# Patient Record
Sex: MALE | Race: WHITE | NOT HISPANIC OR LATINO | Employment: FULL TIME | RURAL
[De-identification: names, ages, dates, MRNs, and addresses within clinical notes are randomized per-mention and may not be internally consistent; named-entity substitution may affect disease eponyms.]

---

## 2019-12-30 ENCOUNTER — OFFICE VISIT (OUTPATIENT)
Dept: FAMILY MEDICINE CLINIC | Facility: CLINIC | Age: 39
End: 2019-12-30

## 2019-12-30 VITALS
BODY MASS INDEX: 29.01 KG/M2 | SYSTOLIC BLOOD PRESSURE: 111 MMHG | RESPIRATION RATE: 18 BRPM | HEART RATE: 64 BPM | WEIGHT: 214.2 LBS | TEMPERATURE: 97.5 F | HEIGHT: 72 IN | OXYGEN SATURATION: 96 % | DIASTOLIC BLOOD PRESSURE: 79 MMHG

## 2019-12-30 DIAGNOSIS — E55.9 VITAMIN D DEFICIENCY: ICD-10-CM

## 2019-12-30 DIAGNOSIS — E78.2 MIXED HYPERLIPIDEMIA: ICD-10-CM

## 2019-12-30 DIAGNOSIS — Z13.29 SCREENING FOR HYPOTHYROIDISM: ICD-10-CM

## 2019-12-30 DIAGNOSIS — Z00.00 PHYSICAL EXAM: Primary | ICD-10-CM

## 2019-12-30 DIAGNOSIS — R53.81 MALAISE AND FATIGUE: ICD-10-CM

## 2019-12-30 DIAGNOSIS — Z13.6 SCREENING FOR HYPERTENSION: ICD-10-CM

## 2019-12-30 DIAGNOSIS — R53.83 MALAISE AND FATIGUE: ICD-10-CM

## 2019-12-30 PROBLEM — J01.90 SINUSITIS, ACUTE: Status: ACTIVE | Noted: 2019-03-30

## 2019-12-30 PROCEDURE — 99386 PREV VISIT NEW AGE 40-64: CPT | Performed by: FAMILY MEDICINE

## 2019-12-30 NOTE — PROGRESS NOTES
Chief Complaint   Patient presents with   • Annual Exam       History of Present Illness:  Subjective     History of Present Illness   Abner Champion is a 39 y.o. male here for his annual physical with me. Abner is here for coordination of medical care, to discuss health maintenance, disease prevention as well as to followup on medical problems. Patient is here for a new patient physical. Patient's last CPE was a doctor in [place]. Activity level is minimal. Exercises 0 per week. Appetite is good. Feels well with few complaints. Energy level is good and fair. Sleeps fairly well. Patient has never had to have a colonoscopy. Patient is  doing routine self skin exam monthly. Patient states that over all he is pretty healthy. He stats that he did just get over having the Flu. He states that other than that he fells pretty well.     Allergies:  No Known Allergies    Social History:  Social History     Socioeconomic History   • Marital status:      Spouse name: Not on file   • Number of children: Not on file   • Years of education: Not on file   • Highest education level: Not on file   Tobacco Use   • Smoking status: Current Some Day Smoker   • Smokeless tobacco: Never Used       Family History:  Family History   Problem Relation Age of Onset   • Diabetes Paternal Uncle    • Other Paternal Grandfather         memory issues    • Heart disease Paternal Grandfather    • Cancer Paternal Grandfather        Past Medical History :  Active Ambulatory Problems     Diagnosis Date Noted   • Daytime somnolence 05/04/2015   • Physical exam 05/04/2015   • Hyperlipidemia 11/30/2015   • Sinusitis, acute 03/30/2019   • Tobacco use disorder 11/19/2015   • Vitamin D deficiency 11/30/2015     Resolved Ambulatory Problems     Diagnosis Date Noted   • No Resolved Ambulatory Problems     No Additional Past Medical History       Medication List:    Current Outpatient Medications:   •  Multiple Vitamins-Minerals (CENTRUM MEN) tablet,  "CENTRUM MEN TABS, Disp: , Rfl:     Past Surgical History:  Past Surgical History:   Procedure Laterality Date   • HERNIA REPAIR      as a baby   • KNEE SURGERY          The following portions of the patient's history were reviewed and updated as appropriate: allergies, current medications, past family history, past medical history, past social history, past surgical history and problem list.    Review Of Systems:  Review of Systems   Constitutional: Negative for activity change, appetite change and fatigue.   HENT: Negative for congestion, postnasal drip, sinus pressure and sore throat.    Eyes: Negative for blurred vision and itching.   Respiratory: Negative for cough, shortness of breath and wheezing.    Cardiovascular: Negative for chest pain.   Gastrointestinal: Negative for abdominal pain, constipation, nausea, vomiting and GERD.   Endocrine: Negative for cold intolerance and heat intolerance.   Genitourinary: Negative for difficulty urinating, dysuria and urinary incontinence.   Musculoskeletal: Negative for back pain, joint swelling and neck pain.   Skin: Negative for color change and rash.   Neurological: Negative for dizziness, speech difficulty, weakness and memory problem.   Psychiatric/Behavioral: Negative for behavioral problems, decreased concentration, suicidal ideas and depressed mood.       Physical Exam:  Vital Signs:  Vitals:    12/30/19 1440   BP: 111/79   Pulse: 64   Resp: 18   Temp: 97.5 °F (36.4 °C)   SpO2: 96%   Weight: 97.2 kg (214 lb 3.2 oz)   Height: 182.9 cm (72\")     Body mass index is 29.05 kg/m².    Physical Exam   Constitutional: He is oriented to person, place, and time. He appears well-developed and well-nourished. He is active.   HENT:   Head: Normocephalic and atraumatic.   Nose: Nose normal.   Eyes: Pupils are equal, round, and reactive to light. Conjunctivae and lids are normal.   Neck: Normal range of motion. Neck supple.   Cardiovascular: Normal rate, regular rhythm, normal " heart sounds and normal pulses.   Pulmonary/Chest: Effort normal and breath sounds normal. No respiratory distress.   Abdominal: Soft. Bowel sounds are normal.   Musculoskeletal: Normal range of motion.   Neurological: He is alert and oriented to person, place, and time.   Skin: Skin is warm and dry. Capillary refill takes less than 2 seconds.   Psychiatric: He has a normal mood and affect. His behavior is normal. Judgment and thought content normal.   Vitals reviewed.        Assessment and Plan:  Diagnoses and all orders for this visit:    1. Physical exam (Primary)  Assessment & Plan:  Discussed injury prevention, diet and exercise, safe sexual practices, and screening for common diseases. Encouraged use of sunscreen and seatbelts. Avoidance of tobacco encouraged. Limitation or avoidance of alcohol encouraged. Recommend yearly dental and eye exams. Also discussed monitoring of blood pressure, lipids.        2. Mixed hyperlipidemia  -     Lipid Panel With / Chol / HDL Ratio    3. Vitamin D deficiency  -     Vitamin D 25 Hydroxy    4. Screening for hypertension  -     CBC & Differential  -     Comprehensive Metabolic Panel    5. Screening for hypothyroidism  -     TSH    6. Malaise and fatigue  -     Vitamin D 25 Hydroxy  -     Vitamin B12  -     Folate  -     Testosterone (Free & Total), LC / MS

## 2019-12-30 NOTE — ASSESSMENT & PLAN NOTE
Discussed injury prevention, diet and exercise, safe sexual practices, and screening for common diseases. Encouraged use of sunscreen and seatbelts. Avoidance of tobacco encouraged. Limitation or avoidance of alcohol encouraged. Recommend yearly dental and eye exams. Also discussed monitoring of blood pressure, lipids.

## 2020-01-05 LAB
25(OH)D3+25(OH)D2 SERPL-MCNC: 28.3 NG/ML (ref 30–100)
ALBUMIN SERPL-MCNC: 4.7 G/DL (ref 3.5–5.5)
ALBUMIN/GLOB SERPL: 1.7 {RATIO} (ref 1.2–2.2)
ALP SERPL-CCNC: 64 IU/L (ref 39–117)
ALT SERPL-CCNC: 29 IU/L (ref 0–44)
AST SERPL-CCNC: 32 IU/L (ref 0–40)
BASOPHILS # BLD AUTO: 0 X10E3/UL (ref 0–0.2)
BASOPHILS NFR BLD AUTO: 0 %
BILIRUB SERPL-MCNC: 0.3 MG/DL (ref 0–1.2)
BUN SERPL-MCNC: 14 MG/DL (ref 6–24)
BUN/CREAT SERPL: 13 (ref 9–20)
CALCIUM SERPL-MCNC: 9.5 MG/DL (ref 8.7–10.2)
CHLORIDE SERPL-SCNC: 100 MMOL/L (ref 96–106)
CHOLEST SERPL-MCNC: 235 MG/DL (ref 100–199)
CHOLEST/HDLC SERPL: 4.7 RATIO (ref 0–5)
CO2 SERPL-SCNC: 26 MMOL/L (ref 20–29)
CREAT SERPL-MCNC: 1.11 MG/DL (ref 0.76–1.27)
EOSINOPHIL # BLD AUTO: 0.2 X10E3/UL (ref 0–0.4)
EOSINOPHIL NFR BLD AUTO: 4 %
ERYTHROCYTE [DISTWIDTH] IN BLOOD BY AUTOMATED COUNT: 13.7 % (ref 12.3–15.4)
FOLATE SERPL-MCNC: 9.4 NG/ML
GLOBULIN SER CALC-MCNC: 2.7 G/DL (ref 1.5–4.5)
GLUCOSE SERPL-MCNC: 85 MG/DL (ref 65–99)
HCT VFR BLD AUTO: 44.5 % (ref 37.5–51)
HDLC SERPL-MCNC: 50 MG/DL
HGB BLD-MCNC: 15.5 G/DL (ref 13–17.7)
IMM GRANULOCYTES # BLD AUTO: 0 X10E3/UL (ref 0–0.1)
IMM GRANULOCYTES NFR BLD AUTO: 0 %
LDLC SERPL CALC-MCNC: 155 MG/DL (ref 0–99)
LYMPHOCYTES # BLD AUTO: 2.2 X10E3/UL (ref 0.7–3.1)
LYMPHOCYTES NFR BLD AUTO: 41 %
MCH RBC QN AUTO: 30.5 PG (ref 26.6–33)
MCHC RBC AUTO-ENTMCNC: 34.8 G/DL (ref 31.5–35.7)
MCV RBC AUTO: 87 FL (ref 79–97)
MONOCYTES # BLD AUTO: 0.7 X10E3/UL (ref 0.1–0.9)
MONOCYTES NFR BLD AUTO: 13 %
NEUTROPHILS # BLD AUTO: 2.3 X10E3/UL (ref 1.4–7)
NEUTROPHILS NFR BLD AUTO: 42 %
PLATELET # BLD AUTO: 303 X10E3/UL (ref 150–450)
POTASSIUM SERPL-SCNC: 4.8 MMOL/L (ref 3.5–5.2)
PROT SERPL-MCNC: 7.4 G/DL (ref 6–8.5)
RBC # BLD AUTO: 5.09 X10E6/UL (ref 4.14–5.8)
SODIUM SERPL-SCNC: 142 MMOL/L (ref 134–144)
TESTOST FREE SERPL-MCNC: 6.4 PG/ML (ref 6.8–21.5)
TESTOST SERPL-MCNC: 188 NG/DL (ref 264–916)
TRIGL SERPL-MCNC: 148 MG/DL (ref 0–149)
TSH SERPL DL<=0.005 MIU/L-ACNC: 3.96 UIU/ML (ref 0.45–4.5)
VIT B12 SERPL-MCNC: 697 PG/ML (ref 232–1245)
VLDLC SERPL CALC-MCNC: 30 MG/DL (ref 5–40)
WBC # BLD AUTO: 5.4 X10E3/UL (ref 3.4–10.8)

## 2020-09-08 DIAGNOSIS — M54.16 LUMBAR BACK PAIN WITH RADICULOPATHY AFFECTING LEFT LOWER EXTREMITY: ICD-10-CM

## 2020-09-08 DIAGNOSIS — M21.372 FOOT DROP, LEFT: Primary | ICD-10-CM

## 2020-09-09 ENCOUNTER — TELEPHONE (OUTPATIENT)
Dept: FAMILY MEDICINE CLINIC | Facility: CLINIC | Age: 40
End: 2020-09-09

## 2020-09-09 NOTE — TELEPHONE ENCOUNTER
Patient called yesterday and asked if we could get an MRI approved for him.   He has an appointment tomorrow with Dr Hutchison- she will address at that time. I called him to speak with him so he is aware she will be seeing him tomorrow.

## 2020-09-10 ENCOUNTER — OFFICE VISIT (OUTPATIENT)
Dept: FAMILY MEDICINE CLINIC | Facility: CLINIC | Age: 40
End: 2020-09-10

## 2020-09-10 ENCOUNTER — TRANSCRIBE ORDERS (OUTPATIENT)
Dept: ADMINISTRATIVE | Facility: HOSPITAL | Age: 40
End: 2020-09-10

## 2020-09-10 VITALS
RESPIRATION RATE: 18 BRPM | WEIGHT: 205.2 LBS | HEART RATE: 83 BPM | TEMPERATURE: 97.7 F | OXYGEN SATURATION: 98 % | HEIGHT: 72 IN | SYSTOLIC BLOOD PRESSURE: 128 MMHG | DIASTOLIC BLOOD PRESSURE: 60 MMHG | BODY MASS INDEX: 27.79 KG/M2

## 2020-09-10 DIAGNOSIS — M54.16 LUMBAR RADICULOPATHY: ICD-10-CM

## 2020-09-10 DIAGNOSIS — M21.372 FOOT DROP, LEFT: Primary | ICD-10-CM

## 2020-09-10 DIAGNOSIS — M21.372 LEFT FOOT DROP: Primary | ICD-10-CM

## 2020-09-10 PROCEDURE — 99213 OFFICE O/P EST LOW 20 MIN: CPT | Performed by: FAMILY MEDICINE

## 2020-09-10 RX ORDER — GABAPENTIN 300 MG/1
300 CAPSULE ORAL NIGHTLY
Qty: 30 CAPSULE | Refills: 1 | Status: SHIPPED | OUTPATIENT
Start: 2020-09-10 | End: 2021-08-30

## 2020-09-10 NOTE — PROGRESS NOTES
Chief Complaint   Patient presents with   • Foot Pain     numbness       Subjective   Abner Champion is a 40 y.o. male.     Foot Pain   This is a new (left foot) problem. Episode onset: one week. The problem occurs constantly. The problem has been gradually worsening. Associated symptoms include numbness and weakness. Pertinent negatives include no abdominal pain, chest pain, chills, fever, joint swelling, neck pain or rash. Associated symptoms comments: Tingling. The symptoms are aggravated by walking and twisting. He has tried heat, ice, acetaminophen, rest and immobilization (steroids) for the symptoms. The treatment provided mild relief.     Patient was seen at the  for this complaint and started on oral steroids.  Patient is here today to request an MRI be ordered.  Review of records showing that MRI has been ordered by  and he has an upcoming appointment with neurosurgery next week.       I have reviewed and updated his medications, medical history and problem list during today's office visit.       Past Medical History :  Active Ambulatory Problems     Diagnosis Date Noted   • Daytime somnolence 05/04/2015   • Physical exam 05/04/2015   • Hyperlipidemia 11/30/2015   • Sinusitis, acute 03/30/2019   • Tobacco use disorder 11/19/2015   • Vitamin D deficiency 11/30/2015     Resolved Ambulatory Problems     Diagnosis Date Noted   • No Resolved Ambulatory Problems     Past Medical History:   Diagnosis Date   • Low back pain        Medication List:    Current Outpatient Medications:   •  methylPREDNISolone (MEDROL) 4 MG dose pack, Take as directed on package instructions., Disp: 1 each, Rfl: 0  •  Multiple Vitamins-Minerals (CENTRUM MEN) tablet, CENTRUM MEN TABS, Disp: , Rfl:     No Known Allergies    Social History     Tobacco Use   • Smoking status: Former Smoker     Years: 15.00     Types: Cigarettes   • Smokeless tobacco: Current User     Types: Chew   Substance Use Topics   • Alcohol use: Yes     Comment:  "rare       Review of Systems   Constitutional: Negative for chills and fever.   Respiratory: Negative for shortness of breath.    Cardiovascular: Negative for chest pain and leg swelling.   Gastrointestinal: Negative for abdominal pain and diarrhea.   Genitourinary: Negative for difficulty urinating and urinary incontinence.   Musculoskeletal: Positive for gait problem. Negative for back pain, joint swelling, neck pain and neck stiffness.   Skin: Negative for rash and bruise.   Neurological: Positive for weakness and numbness. Negative for dizziness, tremors and syncope.       I have reviewed and confirmed the accuracy of the ROS as documented by the MA/LPN/RN Breana Hutchison MD      Objective   Vitals:    09/10/20 0822   BP: 128/60   BP Location: Right arm   Patient Position: Sitting   Cuff Size: Large Adult   Pulse: 83   Resp: 18   Temp: 97.7 °F (36.5 °C)   TempSrc: Skin   SpO2: 98%   Weight: 93.1 kg (205 lb 3.2 oz)   Height: 182.9 cm (72\")     Body mass index is 27.83 kg/m².    Physical Exam   Constitutional: He is oriented to person, place, and time. He appears well-developed and well-nourished. He does not appear ill. No distress.   HENT:   Head: Normocephalic and atraumatic.   Nose: Nose normal.   Mouth/Throat: Mucous membranes are moist.   Eyes: Conjunctivae are normal. No scleral icterus.   Neck: Normal range of motion. Neck supple.   Cardiovascular: Normal rate, regular rhythm and normal heart sounds.   No murmur heard.  Pulmonary/Chest: Effort normal and breath sounds normal.   Abdominal: Normal appearance.   Musculoskeletal: No swelling, tenderness, deformity or signs of injury.      Right lower leg: No edema.      Left lower leg: No edema.      Left foot: Normal range of motion. No tenderness or swelling.   Lymphadenopathy:     He has no cervical adenopathy.   Neurological: He is alert and oriented to person, place, and time. He displays weakness (0/5 dorisflexion of the left foot, otherwise, " 5/5 motor left lower extremity). He displays no atrophy. No cranial nerve deficit. He exhibits normal muscle tone. Coordination normal. Gait (foot drop) abnormal. Coordination normal.   Skin: Skin is warm. Capillary refill takes less than 2 seconds. No rash noted.   Psychiatric: His behavior is normal. Mood, judgment and thought content normal.       Mri Lumbar Spine Without Contrast    Result Date: 9/15/2020  Impression: IMPRESSION :  1. Degenerative changes as described. 2. Broad-based disc osteophyte complex most prominent in the left paracentral/lateral location at L5-S1. There is a left-sided neural foraminal narrowing without nerve root impingement. 3. Level by level analysis is discussed above.  Electronically Signed By-Paulino Contreras On:9/15/2020 8:50 AM This report was finalized on [id number] by  Paulino Contreras, .    Assessment/Plan     Diagnoses and all orders for this visit:    1. Left foot drop (Primary)  -     gabapentin (NEURONTIN) 300 MG capsule; Take 1 capsule by mouth Every Night.  Dispense: 30 capsule; Refill: 1  -     Cancel: MRI Lumbar Spine Without Contrast; Future    2. Lumbar radiculopathy  -     gabapentin (NEURONTIN) 300 MG capsule; Take 1 capsule by mouth Every Night.  Dispense: 30 capsule; Refill: 1    I will have him try gabapentin.  We will attempt to move his MRI up, if possible, so that he can have imaging complete prior to his appointment with neurosurgery next week.    No follow-ups on file.     I wore protective equipment throughout this patient encounter to include mask and eye protection. Hand hygiene was performed before donning protective equipment and after removal when leaving the room.  Patient also wore a mask.

## 2020-09-15 ENCOUNTER — HOSPITAL ENCOUNTER (OUTPATIENT)
Dept: MRI IMAGING | Facility: HOSPITAL | Age: 40
Discharge: HOME | End: 2020-09-15

## 2020-09-15 DIAGNOSIS — M21.372 LEFT FOOT DROP: ICD-10-CM

## 2020-09-15 DIAGNOSIS — M21.372 FOOT DROP, LEFT: ICD-10-CM

## 2020-09-15 DIAGNOSIS — M54.16 LUMBAR BACK PAIN WITH RADICULOPATHY AFFECTING LEFT LOWER EXTREMITY: ICD-10-CM

## 2020-09-15 PROCEDURE — 72148 MRI LUMBAR SPINE W/O DYE: CPT

## 2020-09-16 ENCOUNTER — OFFICE VISIT (OUTPATIENT)
Dept: NEUROSURGERY | Facility: CLINIC | Age: 40
End: 2020-09-16

## 2020-09-16 ENCOUNTER — HOSPITAL ENCOUNTER (OUTPATIENT)
Dept: INFUSION THERAPY | Facility: HOSPITAL | Age: 40
Discharge: HOME | End: 2020-09-16
Admitting: PHYSICAL MEDICINE & REHABILITATION

## 2020-09-16 ENCOUNTER — TELEPHONE (OUTPATIENT)
Dept: NEUROSURGERY | Facility: CLINIC | Age: 40
End: 2020-09-16

## 2020-09-16 DIAGNOSIS — G57.32 PERONEAL NERVE PALSY, LEFT: ICD-10-CM

## 2020-09-16 DIAGNOSIS — M21.372 FOOT DROP, LEFT: ICD-10-CM

## 2020-09-16 DIAGNOSIS — M47.16 SPONDYLOSIS WITH MYELOPATHY, LUMBAR REGION: ICD-10-CM

## 2020-09-16 DIAGNOSIS — M21.372 FOOT DROP, LEFT: Primary | ICD-10-CM

## 2020-09-16 PROCEDURE — 99442 PR PHYS/QHP TELEPHONE EVALUATION 11-20 MIN: CPT | Performed by: NURSE PRACTITIONER

## 2020-09-16 PROCEDURE — 95886 MUSC TEST DONE W/N TEST COMP: CPT

## 2020-09-16 PROCEDURE — 95908 NRV CNDJ TST 3-4 STUDIES: CPT

## 2020-09-16 RX ORDER — MELOXICAM 15 MG/1
15 TABLET ORAL NIGHTLY
Qty: 30 TABLET | Refills: 0 | Status: SHIPPED | OUTPATIENT
Start: 2020-09-16 | End: 2021-08-30

## 2020-09-16 NOTE — PROGRESS NOTES
You have chosen to receive care through a telephone visit. Do you consent to use a telephone visit for your medical care today? Yes      Subjective   History of Present Illness: Abner Champion is a 40 y.o. male being seen via telehealth today as a new patient.  Patient has complaints of left foot drop.  I have reviewed his records.  Patient complains of left foot drop that began approximately mid August.  Patient was running and noticed that his left ankle started pronating when he was running and he felt weak in his foot.  By September 5, patient totally lost all dorsiflexion in his left foot.  He also states that when this first happened he did experience some extreme pain from his knee down.  He stated that when he laid down flat it did aggravate his pain.  Patient reports doing nerve flossing daily which did resolve his pain.   He also states that he has accompanying numbness in his lower shin laterally into his big toe.  Patient did present to the [facility] when this first happened and he was referred here after imaging was completed.  Patient was prescribed dose of steroids that he reports did not help.  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  He states that during these long flights he does prop his left leg up at the edge of a desk along the outside of his knee.          The following portions of the patient's history were reviewed and updated as appropriate: allergies, current medications, past family history, past medical history, past social history, past surgical history and problem list.    Review of Systems   Genitourinary: Negative for difficulty urinating.   Musculoskeletal: Positive for back pain. Negative for gait problem, neck pain and neck stiffness.   Neurological: Positive for weakness and numbness.   All other systems reviewed and are negative.      Objective     .There were no vitals taken for this visit.   There is no height or weight on file to calculate BMI.    No  physical assessment performed due to telehealth visit  Patient is alert and oriented x3  Recent and remote memory intact  Speech is articulate and coherent    Assessment/Plan   Independent Review of Radiographic Studies:      I personally reviewed the images from the following studies.    MRI 9/15/2020  1.  Multilevel degenerative changes with spondylosis most notably at L4-L5 and L5-S1  2.  Large disc osteophyte complex most prominent in the left paracentral/lateral location at L5-S1 resulting in neuroforaminal narrowing.    Medical Decision Making:    Patient will be scheduled for an in office visit with Dr. Garcia.  He will need further assessment and evaluation for his left foot drop.  I am not certain at this time whether he is suffering from a peroneal palsy related to his left leg and knee positioning during his long flights or  a result of a radiculopathy  at the L L5-S1 nerve root.  Patient's MRI does correlate with his collective symptoms even though his pain is better he still is having paracentesis along that dermatome as well as motor weakness.  I will place him on a course of meloxicam to help reduce any inflammation and see if this does help.  Abner was seen today for leg pain.    Diagnoses and all orders for this visit:    Foot drop, left    Peroneal nerve palsy, left    Spondylosis with myelopathy, lumbar region    Other orders  -     meloxicam (MOBIC) 15 MG tablet; Take 1 tablet by mouth Every Night.      Return in about 1 week (around 9/23/2020).        I spent 20 minutes with the patient in direct communication reviewing patient symptoms and complaints and explaining the pathology and the treatment plan.  Patient understands and agrees with plan wishes to proceed.      Ashley Hernández, ROSS  09/16/20  13:54 EDT

## 2020-09-16 NOTE — PROCEDURES
HISTORY:     The patient is a 40-year-old non-diabetic male who presents with a complaint of left foot drop.  He states that he has been experiencing the symptoms since 9/5/20.  Associated symptoms include numbness of the left foot and lateral leg.    The patient denies pain in the low back or left knee.        I. NERVE CONDUCTION STUDIES:       The distal latency of the left peroneal motor nerve is 5.9 ms. The amplitude of evoked response is 325 uV (normal > 2.0 mV) at the ankle, 300 uV below the knee, and 75 uV above the knee. The conduction velocity is 46 m/sec below the knee and 29 m/s above the knee.       The distal latency of the left tibial motor nerve is 5.0 ms. The amplitude of evoked response is 4.0 mV. The conduction velocity is 40 m/sec.      The distal latency of the left sural sensory nerve is 3.0 ms. The amplitude of evoked response is 24 microvolts (normal > 15 uV).       II. ELECTROMYOGRAPHY:       Electromyography was performed on the following muscles of the left lower extremity using a monopolar needle: Tibialis anterior, peroneus longus, medial and lateral gastrocnemius, bicep femoris (long and short head), and extensor hallucis longus. The lumbar paraspinal muscles were also examined.      There were no changes in insertional activity. Denervation potentials were present in the left peroneus longus.  (NOTE: Peak denervation potentials tend to occur 10 to 14 days after the onset of symptoms.)    There was a moderate decrease in recruitment of MUAPs from the left peroneus longus and a marked decrease in recruitment of MUAPs from the left tibialis anterior and extensor hallucis longus.         III. IMPRESSION:      1. The study shows evidence of a neuropathic process involving the left peroneal nerve.  The pattern of electrophysiologic abnormalities  Is consistent with a neuropathic process involving the peroneal nerve at the level of or distal to the fibular head.      2. There is no  electrophysiologic evidence of a neuropathic process involving the tibial nerve.    3. There is no electrophysiologic evidence of a lumbar radiculopathy.  If clinical suspicion is high for a radicular process, spinal imaging should be considered.              Shelli Munguia M.D.*

## 2020-09-17 ENCOUNTER — APPOINTMENT (OUTPATIENT)
Dept: MRI IMAGING | Facility: HOSPITAL | Age: 40
End: 2020-09-17

## 2020-09-21 ENCOUNTER — OFFICE VISIT (OUTPATIENT)
Dept: NEUROSURGERY | Facility: CLINIC | Age: 40
End: 2020-09-21

## 2020-09-21 VITALS
DIASTOLIC BLOOD PRESSURE: 84 MMHG | WEIGHT: 200 LBS | BODY MASS INDEX: 27.09 KG/M2 | HEIGHT: 72 IN | HEART RATE: 67 BPM | RESPIRATION RATE: 18 BRPM | SYSTOLIC BLOOD PRESSURE: 135 MMHG

## 2020-09-21 DIAGNOSIS — M21.372 LEFT FOOT DROP: Primary | ICD-10-CM

## 2020-09-21 DIAGNOSIS — M21.372 FOOT DROP, LEFT: ICD-10-CM

## 2020-09-21 DIAGNOSIS — S84.12XA COMMON PERONEAL NERVE DYSFUNCTION OF LEFT LOWER EXTREMITY, INITIAL ENCOUNTER: ICD-10-CM

## 2020-09-21 PROCEDURE — 99203 OFFICE O/P NEW LOW 30 MIN: CPT | Performed by: NEUROLOGICAL SURGERY

## 2020-09-25 ENCOUNTER — TELEPHONE (OUTPATIENT)
Dept: NEUROSURGERY | Facility: CLINIC | Age: 40
End: 2020-09-25

## 2020-09-25 NOTE — TELEPHONE ENCOUNTER
THE PATIENT CALLED REGARDING HIS APPOINTMENT 09/21/20 WITH DR. ANDINO. HE STATED THAT HE WAS ADVISED TO HAVE AN MRI AND WAS CALLING TO SCHEDULE, BUT THERE ARE NO NEW ORDERS FOR AN MRI IN THE PATIENT'S CHART. THE PATIENT STATED THAT DR. ANDINO MENTIONED ORDERING OTHER TESTS AS WELL BUT COULD NOT RECALL WHAT THEY WERE.     PLEASE ADVISE. PATIENT CAN BE REACHED AT: [phone]    THANK YOU!

## 2020-10-05 PROBLEM — G57.30 COMMON PERONEAL NERVE DYSFUNCTION: Status: ACTIVE | Noted: 2020-10-05

## 2020-10-05 PROBLEM — M21.372 FOOT DROP, LEFT: Status: ACTIVE | Noted: 2020-10-05

## 2020-10-05 PROBLEM — S84.10XA COMMON PERONEAL NERVE DYSFUNCTION: Status: ACTIVE | Noted: 2020-10-05

## 2020-10-15 ENCOUNTER — TELEPHONE (OUTPATIENT)
Dept: NEUROSURGERY | Facility: CLINIC | Age: 40
End: 2020-10-15

## 2020-10-15 NOTE — TELEPHONE ENCOUNTER
THE PATIENT CALLED TO FOLLOW UP ON HIS MRI ORDER. HE SAID THAT HE LAST SPOKE WITH [facility] ON 9/25 AND WAS TOLD THAT THE OFFICE WAS FINALIZING SOME PAPERWORK TO HAVE THE MRI ORDERED.     PLEASE ADVISE. THE PATIENT CAN BE REACHED AT: [phone]    THANK YOU!

## 2020-10-15 NOTE — TELEPHONE ENCOUNTER
Called and left message for patient to call back.  He does not need an MRI.  He needs repeat EMG/NCV in 2 months.

## 2020-11-02 ENCOUNTER — TELEPHONE (OUTPATIENT)
Dept: NEUROSURGERY | Facility: CLINIC | Age: 40
End: 2020-11-02

## 2020-11-02 NOTE — TELEPHONE ENCOUNTER
THE PATIENT CALLED REGARDING HIS EMG WHICH WAS SCHEDULED FOR 11/10. THE PATIENT WAS CALLED AND TOLD THAT THE APPOINTMENT WAS CANCELLED, AND THAT HE WOULD BE CALLED BACK TO RESCHEDULE. HE WAS CALLED ABOUT THIS LAST WEEK AND HADN'T HEARD ANYTHING ABOUT SCHEDULING A NEW APPOINTMENT YET.     PLEASE ADVISE. THE PATIENT CAN BE REACHED AT: [phone]    THANK YOU!

## 2020-11-11 ENCOUNTER — HOSPITAL ENCOUNTER (OUTPATIENT)
Dept: INFUSION THERAPY | Facility: HOSPITAL | Age: 40
Discharge: HOME | End: 2020-11-11
Admitting: PHYSICAL MEDICINE & REHABILITATION

## 2020-11-11 ENCOUNTER — APPOINTMENT (OUTPATIENT)
Dept: INFUSION THERAPY | Facility: HOSPITAL | Age: 40
End: 2020-11-11

## 2020-11-11 DIAGNOSIS — M21.372 LEFT FOOT DROP: ICD-10-CM

## 2020-11-11 PROCEDURE — 95908 NRV CNDJ TST 3-4 STUDIES: CPT

## 2020-11-11 PROCEDURE — 95886 MUSC TEST DONE W/N TEST COMP: CPT

## 2020-11-11 NOTE — PROCEDURES
HISTORY:     The patient is a 40-year-old male who presents with a complaint of left foot drop and numbness of the left leg and foot.  He states that he has been experiencing the symptoms since 9/5/20.    This is a follow-up study.  The initial study was performed on 9/16/20.          I. NERVE CONDUCTION STUDIES:       The distal latency of the left peroneal motor nerve is 5.8 ms. The amplitude of evoked response is 350 uV (normal > 2.0 mV) at the ankle, 342 uV below the knee, and 338 uV above the knee. The conduction velocity is 41 m/sec below the knee and 30 m/sec above the knee.       The distal latency of the left tibial motor nerve is 5.1 ms. The amplitude of evoked response is 4.8 mV. The conduction velocity is 48 m/sec.      The distal latency of the left sural sensory nerve is 2.6 ms. The amplitude of evoked response is 21 microvolts.         II. ELECTROMYOGRAPHY:       Electromyography was performed on the following muscles of the left lower extremity using a monopolar needle: Tibialis anterior, peroneus longus, extensor digitorum longus,medial and lateral gastrocnemius, and bicep femoris (short head), and extensor hallucis longus.       There were no changes in insertional activity. Denervation potentials were present in the left tibialis anterior, extensor digitorum longus, and extensor hallucis longus.       There was a mild decrease in recruitment of MUAPs from the left peroneus longus and a moderate decrease in MUAPs from the left tibialis anterior, extensor digitorum longus, and extensor hallucis longus.         III. IMPRESSION:      The study continues to show evidence of a neuropathic process involving the left peroneal nerve at the level of or distal to the fibular head.  Improvement is noted in terms of recruitment of MUAPs since the 9/16/20 study.          Shelli Munguia M.D.*

## 2020-11-13 ENCOUNTER — TELEPHONE (OUTPATIENT)
Dept: NEUROSURGERY | Facility: CLINIC | Age: 40
End: 2020-11-13

## 2020-11-13 NOTE — TELEPHONE ENCOUNTER
----- Message from Buzz Garcia MD sent at 11/13/2020 10:59 AM EST -----  Contacted Pt yesterday.  He feels he has gained significant improvement.  At this time he is lifting his foot above 90 degrees.  Do not feel decompression warranted.  Will continue to follow and gave my cell for him to call in 2 months.

## 2020-11-13 NOTE — PROGRESS NOTES
Contacted Pt yesterday.  He feels he has gained significant improvement.  At this time he is lifting his foot above 90 degrees.  Do not feel decompression warranted.  Will continue to follow and gave my cell for him to call in 2 months.

## 2020-12-16 ENCOUNTER — APPOINTMENT (OUTPATIENT)
Dept: INFUSION THERAPY | Facility: HOSPITAL | Age: 40
End: 2020-12-16

## 2021-08-30 ENCOUNTER — OFFICE VISIT (OUTPATIENT)
Dept: FAMILY MEDICINE CLINIC | Facility: CLINIC | Age: 41
End: 2021-08-30

## 2021-08-30 VITALS
BODY MASS INDEX: 29.39 KG/M2 | TEMPERATURE: 98.4 F | RESPIRATION RATE: 16 BRPM | HEIGHT: 72 IN | OXYGEN SATURATION: 98 % | HEART RATE: 75 BPM | WEIGHT: 217 LBS | DIASTOLIC BLOOD PRESSURE: 70 MMHG | SYSTOLIC BLOOD PRESSURE: 132 MMHG

## 2021-08-30 DIAGNOSIS — Z00.00 PHYSICAL EXAM: Primary | ICD-10-CM

## 2021-08-30 DIAGNOSIS — R53.81 MALAISE AND FATIGUE: ICD-10-CM

## 2021-08-30 DIAGNOSIS — R53.83 MALAISE AND FATIGUE: ICD-10-CM

## 2021-08-30 DIAGNOSIS — E55.9 VITAMIN D DEFICIENCY: ICD-10-CM

## 2021-08-30 DIAGNOSIS — E78.2 MIXED HYPERLIPIDEMIA: ICD-10-CM

## 2021-08-30 DIAGNOSIS — N52.9 ERECTILE DYSFUNCTION, UNSPECIFIED ERECTILE DYSFUNCTION TYPE: ICD-10-CM

## 2021-08-30 PROCEDURE — 99396 PREV VISIT EST AGE 40-64: CPT | Performed by: FAMILY MEDICINE

## 2021-08-30 PROCEDURE — 99213 OFFICE O/P EST LOW 20 MIN: CPT | Performed by: FAMILY MEDICINE

## 2021-08-30 RX ORDER — MELATONIN
1000 DAILY
COMMUNITY

## 2021-08-30 RX ORDER — TADALAFIL 10 MG/1
10 TABLET ORAL DAILY PRN
Qty: 90 TABLET | Refills: 2 | Status: SHIPPED | OUTPATIENT
Start: 2021-08-30 | End: 2022-03-22

## 2021-08-30 NOTE — PROGRESS NOTES
Chief Complaint   Patient presents with   • Annual Exam   • Left foot numbness     Subjective   Abner Champion is a 41 y.o. male here for his annual physical with me. Abner is here for coordination of medical care, to discuss health maintenance, disease prevention as well as to followup on medical problems. Patient has been followed by me since 12/30/2019. Patient's last CPE was 2019. Activity level is minimal. Exercises 1 per week. Appetite is good. Feels well with few complaints. Energy level is poor. Sleeps fairly well. Patient's last colonoscopy was never. Patient is doing routine self skin exam occasionally.    Foot Injury   There was no injury mechanism. The pain is present in the left toes and left foot. Quality: numb. The patient is experiencing no pain. The pain has been constant since onset. Associated symptoms include numbness. He reports no foreign bodies present. Nothing aggravates the symptoms. Treatments tried: EMG, NCS 2020.     Erectile Dysfunction: Patient complains of erectile dysfunction.  Onset of dysfunction was 2 years ago and was gradual in onset.  Patient states the nature of difficulty is both attaining and maintaining erection. Full erections occur rarely. Partial erections occur rarely. Libido is affected. Risk factors for ED include none. Patient's expectations as to sexual function to be able to have sex with a full erection.  Patient's description of relationship w/partner good.  Previous treatment of ED includes nothing.         Allergies:  No Known Allergies    Social History:  Social History     Socioeconomic History   • Marital status:      Spouse name: Not on file   • Number of children: Not on file   • Years of education: Not on file   • Highest education level: Not on file   Tobacco Use   • Smoking status: Former Smoker     Years: 15.00     Types: Cigarettes   • Smokeless tobacco: Current User     Types: Chew   Substance and Sexual Activity   • Alcohol use: Yes      Comment: rare   • Drug use: Never   • Sexual activity: Defer       Family History:  Family History   Problem Relation Age of Onset   • Diabetes Paternal Uncle    • Other Paternal Grandfather         memory issues    • Heart disease Paternal Grandfather    • Cancer Paternal Grandfather        Past Medical History :  Active Ambulatory Problems     Diagnosis Date Noted   • Daytime somnolence 05/04/2015   • Physical exam 05/04/2015   • Hyperlipidemia 11/30/2015   • Sinusitis, acute 03/30/2019   • Tobacco use disorder 11/19/2015   • Vitamin D deficiency 11/30/2015   • Foot drop, left 10/05/2020   • 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     • thiamine (VITAMIN B-1) 100 MG tablet  tablet Take 100 mg by mouth Daily.     • tadalafil (CIALIS) 10 MG tablet Take 1 tablet by mouth Daily As Needed for Erectile Dysfunction. 90 tablet 2   • [DISCONTINUED] gabapentin (NEURONTIN) 300 MG capsule Take 1 capsule by mouth Every Night. 30 capsule 1   • [DISCONTINUED] meloxicam (MOBIC) 15 MG tablet Take 1 tablet by mouth Every Night. 30 tablet 0   • [DISCONTINUED] methylPREDNISolone (MEDROL) 4 MG dose pack Take as directed on package instructions. 1 each 0   • [DISCONTINUED] Multiple Vitamins-Minerals (CENTRUM MEN) tablet CENTRUM MEN TABS       No facility-administered encounter medications on file as of 8/30/2021.       Past Surgical History:  Past Surgical History:   Procedure Laterality Date   • HERNIA REPAIR      as a baby   • KNEE SURGERY         Review Of Systems:  Review of Systems   Constitutional: Negative for activity change, appetite change  "and fatigue.   HENT: Negative for congestion, postnasal drip, sinus pressure and sore throat.    Eyes: Negative for blurred vision and itching.   Respiratory: Negative for cough, shortness of breath and wheezing.    Cardiovascular: Negative for chest pain.   Gastrointestinal: Negative for abdominal pain, constipation, nausea, vomiting and GERD.   Endocrine: Negative for cold intolerance and heat intolerance.   Genitourinary: Negative for difficulty urinating, dysuria and urinary incontinence.   Musculoskeletal: Negative for back pain, joint swelling and neck pain.   Skin: Negative for color change and rash.   Neurological: Positive for numbness. Negative for dizziness, speech difficulty, weakness and memory problem.   Psychiatric/Behavioral: Negative for behavioral problems, decreased concentration, suicidal ideas and depressed mood.       The following portions of the patient's history were reviewed and updated as appropriate: allergies, current medications, past family history, past medical history, past social history, past surgical history and problem list.      Physical Exam:  Vital Signs:  Visit Vitals  /70 (BP Location: Right arm, Patient Position: Sitting, Cuff Size: Large Adult)   Pulse 75   Temp 98.4 °F (36.9 °C) (Skin)   Resp 16   Ht 182.9 cm (72\")   Wt 98.4 kg (217 lb)   SpO2 98%   BMI 29.43 kg/m²       Physical Exam  Vitals reviewed.   Constitutional:       Appearance: He is well-developed.   HENT:      Head: Normocephalic and atraumatic.      Nose: Nose normal.   Eyes:      General: Lids are normal.      Conjunctiva/sclera: Conjunctivae normal.      Pupils: Pupils are equal, round, and reactive to light.   Cardiovascular:      Rate and Rhythm: Normal rate and regular rhythm.      Pulses: Normal pulses.      Heart sounds: Normal heart sounds.   Pulmonary:      Effort: Pulmonary effort is normal. No respiratory distress.      Breath sounds: Normal breath sounds.   Abdominal:      General: Bowel " sounds are normal.      Palpations: Abdomen is soft.   Musculoskeletal:         General: Normal range of motion.      Cervical back: Normal range of motion and neck supple.   Skin:     General: Skin is warm and dry.      Capillary Refill: Capillary refill takes less than 2 seconds.   Neurological:      Mental Status: He is alert and oriented to person, place, and time.   Psychiatric:         Behavior: Behavior normal.         Thought Content: Thought content normal.         Judgment: Judgment normal.           Assessment and Plan:  Diagnoses and all orders for this visit:    1. Physical exam (Primary)  Assessment & Plan:  Discussed injury prevention, diet and exercise, safe sexual practices, and screening for common diseases. Encouraged use of sunscreen and seatbelts. Avoidance of tobacco encouraged. Limitation or avoidance of alcohol encouraged. Recommend yearly dental and eye exams. Also discussed monitoring of blood pressure, lipids.      2. Vitamin D deficiency  -     Vitamin D 25 Hydroxy    3. Mixed hyperlipidemia  Assessment & Plan:  Lipid abnormalities are worsening.  Nutritional counseling was provided.  Lipids will be reassessed in 3 months.    Orders:  -     CBC & Differential  -     Comprehensive Metabolic Panel  -     Lipid Panel With / Chol / HDL Ratio    4. Erectile dysfunction, unspecified erectile dysfunction type  Assessment & Plan:  Patient was started on Cialis to help with his symptoms.    Orders:  -     tadalafil (CIALIS) 10 MG tablet; Take 1 tablet by mouth Daily As Needed for Erectile Dysfunction.  Dispense: 90 tablet; Refill: 2    5. Malaise and fatigue  -     TSH  -     Testosterone (Free & Total), LC / MS  -     Vitamin B12

## 2021-09-01 ENCOUNTER — APPOINTMENT (OUTPATIENT)
Dept: CT IMAGING | Facility: HOSPITAL | Age: 41
End: 2021-09-01

## 2021-09-01 ENCOUNTER — HOSPITAL ENCOUNTER (OUTPATIENT)
Facility: HOSPITAL | Age: 41
Setting detail: OBSERVATION
Discharge: HOME | End: 2021-09-02
Attending: INTERNAL MEDICINE | Admitting: INTERNAL MEDICINE

## 2021-09-01 DIAGNOSIS — R10.9 ABDOMINAL PAIN, UNSPECIFIED ABDOMINAL LOCATION: Primary | ICD-10-CM

## 2021-09-01 DIAGNOSIS — R11.2 NAUSEA AND VOMITING, INTRACTABILITY OF VOMITING NOT SPECIFIED, UNSPECIFIED VOMITING TYPE: ICD-10-CM

## 2021-09-01 DIAGNOSIS — D72.829 LEUKOCYTOSIS, UNSPECIFIED TYPE: ICD-10-CM

## 2021-09-01 LAB
ALBUMIN SERPL-MCNC: 4.8 G/DL (ref 3.5–5.2)
ALBUMIN/GLOB SERPL: 1.6 G/DL
ALP SERPL-CCNC: 77 U/L (ref 39–117)
ALT SERPL W P-5'-P-CCNC: 15 U/L (ref 1–41)
ANION GAP SERPL CALCULATED.3IONS-SCNC: 16 MMOL/L (ref 5–15)
AST SERPL-CCNC: 18 U/L (ref 1–40)
B PARAPERT DNA SPEC QL NAA+PROBE: NOT DETECTED
B PERT DNA SPEC QL NAA+PROBE: NOT DETECTED
BASOPHILS # BLD AUTO: 0 10*3/MM3 (ref 0–0.2)
BASOPHILS NFR BLD AUTO: 0.3 % (ref 0–1.5)
BILIRUB SERPL-MCNC: 0.6 MG/DL (ref 0–1.2)
BILIRUB UR QL STRIP: NEGATIVE
BUN SERPL-MCNC: 20 MG/DL (ref 6–20)
BUN/CREAT SERPL: 17.2 (ref 7–25)
C PNEUM DNA NPH QL NAA+NON-PROBE: NOT DETECTED
CALCIUM SPEC-SCNC: 9.7 MG/DL (ref 8.6–10.5)
CHLORIDE SERPL-SCNC: 101 MMOL/L (ref 98–107)
CLARITY UR: ABNORMAL
CO2 SERPL-SCNC: 21 MMOL/L (ref 22–29)
COLOR UR: ABNORMAL
CREAT SERPL-MCNC: 1.16 MG/DL (ref 0.76–1.27)
DEPRECATED RDW RBC AUTO: 40.7 FL (ref 37–54)
EOSINOPHIL # BLD AUTO: 0 10*3/MM3 (ref 0–0.4)
EOSINOPHIL NFR BLD AUTO: 0.1 % (ref 0.3–6.2)
ERYTHROCYTE [DISTWIDTH] IN BLOOD BY AUTOMATED COUNT: 13.1 % (ref 12.3–15.4)
FLUAV SUBTYP SPEC NAA+PROBE: NOT DETECTED
FLUBV RNA ISLT QL NAA+PROBE: NOT DETECTED
GFR SERPL CREATININE-BSD FRML MDRD: 69 ML/MIN/1.73
GLOBULIN UR ELPH-MCNC: 3 GM/DL
GLUCOSE SERPL-MCNC: 133 MG/DL (ref 65–99)
GLUCOSE UR STRIP-MCNC: NEGATIVE MG/DL
HADV DNA SPEC NAA+PROBE: NOT DETECTED
HCOV 229E RNA SPEC QL NAA+PROBE: NOT DETECTED
HCOV HKU1 RNA SPEC QL NAA+PROBE: NOT DETECTED
HCOV NL63 RNA SPEC QL NAA+PROBE: NOT DETECTED
HCOV OC43 RNA SPEC QL NAA+PROBE: NOT DETECTED
HCT VFR BLD AUTO: 46.5 % (ref 37.5–51)
HGB BLD-MCNC: 16.1 G/DL (ref 13–17.7)
HGB UR QL STRIP.AUTO: NEGATIVE
HMPV RNA NPH QL NAA+NON-PROBE: NOT DETECTED
HOLD SPECIMEN: NORMAL
HOLD SPECIMEN: NORMAL
HPIV1 RNA SPEC QL NAA+PROBE: NOT DETECTED
HPIV2 RNA SPEC QL NAA+PROBE: NOT DETECTED
HPIV3 RNA NPH QL NAA+PROBE: NOT DETECTED
HPIV4 P GENE NPH QL NAA+PROBE: NOT DETECTED
KETONES UR QL STRIP: ABNORMAL
LEUKOCYTE ESTERASE UR QL STRIP.AUTO: NEGATIVE
LIPASE SERPL-CCNC: 22 U/L (ref 13–60)
LYMPHOCYTES # BLD AUTO: 1.1 10*3/MM3 (ref 0.7–3.1)
LYMPHOCYTES NFR BLD AUTO: 7.3 % (ref 19.6–45.3)
M PNEUMO IGG SER IA-ACNC: NOT DETECTED
MCH RBC QN AUTO: 30.3 PG (ref 26.6–33)
MCHC RBC AUTO-ENTMCNC: 34.7 G/DL (ref 31.5–35.7)
MCV RBC AUTO: 87.4 FL (ref 79–97)
MONOCYTES # BLD AUTO: 0.8 10*3/MM3 (ref 0.1–0.9)
MONOCYTES NFR BLD AUTO: 5.7 % (ref 5–12)
NEUTROPHILS NFR BLD AUTO: 12.6 10*3/MM3 (ref 1.7–7)
NEUTROPHILS NFR BLD AUTO: 86.6 % (ref 42.7–76)
NITRITE UR QL STRIP: NEGATIVE
NRBC BLD AUTO-RTO: 0.1 /100 WBC (ref 0–0.2)
PH UR STRIP.AUTO: 5.5 [PH] (ref 5–8)
PLATELET # BLD AUTO: 331 10*3/MM3 (ref 140–450)
PMV BLD AUTO: 8 FL (ref 6–12)
POTASSIUM SERPL-SCNC: 3.8 MMOL/L (ref 3.5–5.2)
PROT SERPL-MCNC: 7.8 G/DL (ref 6–8.5)
PROT UR QL STRIP: ABNORMAL
RBC # BLD AUTO: 5.32 10*6/MM3 (ref 4.14–5.8)
RHINOVIRUS RNA SPEC NAA+PROBE: NOT DETECTED
RSV RNA NPH QL NAA+NON-PROBE: NOT DETECTED
SARS-COV-2 RNA NPH QL NAA+NON-PROBE: NOT DETECTED
SODIUM SERPL-SCNC: 138 MMOL/L (ref 136–145)
SP GR UR STRIP: 1.04 (ref 1–1.03)
TROPONIN T SERPL-MCNC: <0.01 NG/ML (ref 0–0.03)
UROBILINOGEN UR QL STRIP: ABNORMAL
WBC # BLD AUTO: 14.6 10*3/MM3 (ref 3.4–10.8)
WHOLE BLOOD HOLD SPECIMEN: NORMAL
WHOLE BLOOD HOLD SPECIMEN: NORMAL

## 2021-09-01 PROCEDURE — 80053 COMPREHEN METABOLIC PANEL: CPT

## 2021-09-01 PROCEDURE — 83690 ASSAY OF LIPASE: CPT

## 2021-09-01 PROCEDURE — 0 IOPAMIDOL PER 1 ML: Performed by: PHYSICIAN ASSISTANT

## 2021-09-01 PROCEDURE — 81003 URINALYSIS AUTO W/O SCOPE: CPT

## 2021-09-01 PROCEDURE — 85652 RBC SED RATE AUTOMATED: CPT | Performed by: NURSE PRACTITIONER

## 2021-09-01 PROCEDURE — 84145 PROCALCITONIN (PCT): CPT | Performed by: NURSE PRACTITIONER

## 2021-09-01 PROCEDURE — 74177 CT ABD & PELVIS W/CONTRAST: CPT

## 2021-09-01 PROCEDURE — 93005 ELECTROCARDIOGRAM TRACING: CPT | Performed by: PHYSICIAN ASSISTANT

## 2021-09-01 PROCEDURE — G0378 HOSPITAL OBSERVATION PER HR: HCPCS

## 2021-09-01 PROCEDURE — 85025 COMPLETE CBC W/AUTO DIFF WBC: CPT

## 2021-09-01 PROCEDURE — 0202U NFCT DS 22 TRGT SARS-COV-2: CPT

## 2021-09-01 PROCEDURE — 86140 C-REACTIVE PROTEIN: CPT | Performed by: NURSE PRACTITIONER

## 2021-09-01 PROCEDURE — 84484 ASSAY OF TROPONIN QUANT: CPT | Performed by: PHYSICIAN ASSISTANT

## 2021-09-01 PROCEDURE — 99284 EMERGENCY DEPT VISIT MOD MDM: CPT

## 2021-09-01 RX ORDER — SODIUM CHLORIDE 0.9 % (FLUSH) 0.9 %
10 SYRINGE (ML) INJECTION AS NEEDED
Status: DISCONTINUED | OUTPATIENT
Start: 2021-09-01 | End: 2021-09-02 | Stop reason: HOSPADM

## 2021-09-01 RX ADMIN — SODIUM CHLORIDE 1000 ML: 9 INJECTION, SOLUTION INTRAVENOUS at 21:58

## 2021-09-01 RX ADMIN — IOPAMIDOL 100 ML: 755 INJECTION, SOLUTION INTRAVENOUS at 21:47

## 2021-09-02 ENCOUNTER — READMISSION MANAGEMENT (OUTPATIENT)
Dept: CALL CENTER | Facility: HOSPITAL | Age: 41
End: 2021-09-02

## 2021-09-02 ENCOUNTER — ON CAMPUS - OUTPATIENT (OUTPATIENT)
Dept: URBAN - METROPOLITAN AREA HOSPITAL 85 | Facility: HOSPITAL | Age: 41
End: 2021-09-02
Payer: COMMERCIAL

## 2021-09-02 ENCOUNTER — ON CAMPUS - OUTPATIENT (OUTPATIENT)
Dept: URBAN - METROPOLITAN AREA HOSPITAL 85 | Facility: HOSPITAL | Age: 41
End: 2021-09-02

## 2021-09-02 VITALS
HEIGHT: 72 IN | BODY MASS INDEX: 28.96 KG/M2 | TEMPERATURE: 97.5 F | OXYGEN SATURATION: 98 % | HEART RATE: 59 BPM | RESPIRATION RATE: 18 BRPM | WEIGHT: 213.85 LBS | DIASTOLIC BLOOD PRESSURE: 85 MMHG | SYSTOLIC BLOOD PRESSURE: 127 MMHG

## 2021-09-02 DIAGNOSIS — E78.5 HYPERLIPIDEMIA, UNSPECIFIED: ICD-10-CM

## 2021-09-02 DIAGNOSIS — R11.2 NAUSEA WITH VOMITING, UNSPECIFIED: ICD-10-CM

## 2021-09-02 DIAGNOSIS — E87.1 HYPO-OSMOLALITY AND HYPONATREMIA: ICD-10-CM

## 2021-09-02 DIAGNOSIS — R12 HEARTBURN: ICD-10-CM

## 2021-09-02 DIAGNOSIS — R10.13 EPIGASTRIC PAIN: ICD-10-CM

## 2021-09-02 DIAGNOSIS — R93.3 ABNORMAL FINDINGS ON DIAGNOSTIC IMAGING OF OTHER PARTS OF DI: ICD-10-CM

## 2021-09-02 DIAGNOSIS — R63.5 ABNORMAL WEIGHT GAIN: ICD-10-CM

## 2021-09-02 LAB
25(OH)D3+25(OH)D2 SERPL-MCNC: 46.7 NG/ML (ref 30–100)
ALBUMIN SERPL-MCNC: 4.7 G/DL (ref 4–5)
ALBUMIN/GLOB SERPL: 1.8 {RATIO} (ref 1.2–2.2)
ALP SERPL-CCNC: 77 IU/L (ref 48–121)
ALT SERPL-CCNC: 18 IU/L (ref 0–44)
ANION GAP SERPL CALCULATED.3IONS-SCNC: 9 MMOL/L (ref 5–15)
APTT PPP: 27.2 SECONDS (ref 24–31)
AST SERPL-CCNC: 18 IU/L (ref 0–40)
BASOPHILS # BLD AUTO: 0.1 10*3/MM3 (ref 0–0.2)
BASOPHILS # BLD AUTO: 0.1 X10E3/UL (ref 0–0.2)
BASOPHILS NFR BLD AUTO: 0.6 % (ref 0–1.5)
BASOPHILS NFR BLD AUTO: 1 %
BILIRUB SERPL-MCNC: 0.4 MG/DL (ref 0–1.2)
BUN SERPL-MCNC: 14 MG/DL (ref 6–24)
BUN SERPL-MCNC: 15 MG/DL (ref 6–20)
BUN/CREAT SERPL: 13.4 (ref 7–25)
BUN/CREAT SERPL: 15 (ref 9–20)
CALCIUM SERPL-MCNC: 9.8 MG/DL (ref 8.7–10.2)
CALCIUM SPEC-SCNC: 8.4 MG/DL (ref 8.6–10.5)
CHLORIDE SERPL-SCNC: 102 MMOL/L (ref 96–106)
CHLORIDE SERPL-SCNC: 104 MMOL/L (ref 98–107)
CHOLEST SERPL-MCNC: 196 MG/DL (ref 0–200)
CHOLEST SERPL-MCNC: 258 MG/DL (ref 100–199)
CHOLEST/HDLC SERPL: 5.6 RATIO (ref 0–5)
CO2 SERPL-SCNC: 25 MMOL/L (ref 20–29)
CO2 SERPL-SCNC: 27 MMOL/L (ref 22–29)
CREAT SERPL-MCNC: 0.96 MG/DL (ref 0.76–1.27)
CREAT SERPL-MCNC: 1.12 MG/DL (ref 0.76–1.27)
CRP SERPL-MCNC: 0.8 MG/DL (ref 0–0.5)
DEPRECATED RDW RBC AUTO: 41.1 FL (ref 37–54)
EOSINOPHIL # BLD AUTO: 0.2 X10E3/UL (ref 0–0.4)
EOSINOPHIL # BLD AUTO: 0.3 10*3/MM3 (ref 0–0.4)
EOSINOPHIL NFR BLD AUTO: 3 %
EOSINOPHIL NFR BLD AUTO: 3.8 % (ref 0.3–6.2)
ERYTHROCYTE [DISTWIDTH] IN BLOOD BY AUTOMATED COUNT: 13.2 % (ref 11.6–15.4)
ERYTHROCYTE [DISTWIDTH] IN BLOOD BY AUTOMATED COUNT: 13.2 % (ref 12.3–15.4)
ERYTHROCYTE [SEDIMENTATION RATE] IN BLOOD: 10 MM/HR (ref 0–15)
GFR SERPL CREATININE-BSD FRML MDRD: 72 ML/MIN/1.73
GLOBULIN SER CALC-MCNC: 2.6 G/DL (ref 1.5–4.5)
GLUCOSE SERPL-MCNC: 82 MG/DL (ref 65–99)
GLUCOSE SERPL-MCNC: 96 MG/DL (ref 65–99)
HCT VFR BLD AUTO: 41.7 % (ref 37.5–51)
HCT VFR BLD AUTO: 47.1 % (ref 37.5–51)
HDLC SERPL-MCNC: 37 MG/DL (ref 40–60)
HDLC SERPL-MCNC: 46 MG/DL
HGB BLD-MCNC: 14.5 G/DL (ref 13–17.7)
HGB BLD-MCNC: 15.7 G/DL (ref 13–17.7)
IMM GRANULOCYTES # BLD AUTO: 0 X10E3/UL (ref 0–0.1)
IMM GRANULOCYTES NFR BLD AUTO: 0 %
INR PPP: 0.98 (ref 0.93–1.1)
LDLC SERPL CALC-MCNC: 142 MG/DL (ref 0–100)
LDLC SERPL CALC-MCNC: 161 MG/DL (ref 0–99)
LDLC/HDLC SERPL: 3.8 {RATIO}
LYMPHOCYTES # BLD AUTO: 2.1 10*3/MM3 (ref 0.7–3.1)
LYMPHOCYTES # BLD AUTO: 2.2 X10E3/UL (ref 0.7–3.1)
LYMPHOCYTES NFR BLD AUTO: 25.4 % (ref 19.6–45.3)
LYMPHOCYTES NFR BLD AUTO: 31 %
MCH RBC QN AUTO: 30.4 PG (ref 26.6–33)
MCH RBC QN AUTO: 31.4 PG (ref 26.6–33)
MCHC RBC AUTO-ENTMCNC: 33.3 G/DL (ref 31.5–35.7)
MCHC RBC AUTO-ENTMCNC: 34.8 G/DL (ref 31.5–35.7)
MCV RBC AUTO: 90.3 FL (ref 79–97)
MCV RBC AUTO: 91 FL (ref 79–97)
MONOCYTES # BLD AUTO: 0.7 X10E3/UL (ref 0.1–0.9)
MONOCYTES # BLD AUTO: 0.8 10*3/MM3 (ref 0.1–0.9)
MONOCYTES NFR BLD AUTO: 10 %
MONOCYTES NFR BLD AUTO: 10 % (ref 5–12)
NEUTROPHILS # BLD AUTO: 4.1 X10E3/UL (ref 1.4–7)
NEUTROPHILS NFR BLD AUTO: 5 10*3/MM3 (ref 1.7–7)
NEUTROPHILS NFR BLD AUTO: 55 %
NEUTROPHILS NFR BLD AUTO: 60.2 % (ref 42.7–76)
NRBC BLD AUTO-RTO: 0.1 /100 WBC (ref 0–0.2)
PLATELET # BLD AUTO: 257 10*3/MM3 (ref 140–450)
PLATELET # BLD AUTO: 337 X10E3/UL (ref 150–450)
PMV BLD AUTO: 8.1 FL (ref 6–12)
POTASSIUM SERPL-SCNC: 4.5 MMOL/L (ref 3.5–5.2)
POTASSIUM SERPL-SCNC: 4.9 MMOL/L (ref 3.5–5.2)
PROCALCITONIN SERPL-MCNC: 0.05 NG/ML (ref 0–0.25)
PROT SERPL-MCNC: 7.3 G/DL (ref 6–8.5)
PROTHROMBIN TIME: 10.9 SECONDS (ref 9.6–11.7)
RBC # BLD AUTO: 4.62 10*6/MM3 (ref 4.14–5.8)
RBC # BLD AUTO: 5.17 X10E6/UL (ref 4.14–5.8)
SODIUM SERPL-SCNC: 140 MMOL/L (ref 136–145)
SODIUM SERPL-SCNC: 142 MMOL/L (ref 134–144)
TESTOST FREE SERPL-MCNC: 6.4 PG/ML (ref 6.8–21.5)
TESTOST SERPL-MCNC: 392.1 NG/DL (ref 264–916)
TRIGL SERPL-MCNC: 275 MG/DL (ref 0–149)
TRIGL SERPL-MCNC: 92 MG/DL (ref 0–150)
TROPONIN T SERPL-MCNC: <0.01 NG/ML (ref 0–0.03)
TSH SERPL DL<=0.005 MIU/L-ACNC: 2.18 UIU/ML (ref 0.45–4.5)
VIT B12 SERPL-MCNC: 653 PG/ML (ref 232–1245)
VLDLC SERPL CALC-MCNC: 51 MG/DL (ref 5–40)
VLDLC SERPL-MCNC: 17 MG/DL (ref 5–40)
WBC # BLD AUTO: 7.4 X10E3/UL (ref 3.4–10.8)
WBC # BLD AUTO: 8.2 10*3/MM3 (ref 3.4–10.8)

## 2021-09-02 PROCEDURE — 99253 IP/OBS CNSLTJ NEW/EST LOW 45: CPT | Performed by: NURSE PRACTITIONER

## 2021-09-02 PROCEDURE — G0378 HOSPITAL OBSERVATION PER HR: HCPCS

## 2021-09-02 PROCEDURE — 99217 PR OBSERVATION CARE DISCHARGE MANAGEMENT: CPT | Performed by: INTERNAL MEDICINE

## 2021-09-02 PROCEDURE — 85025 COMPLETE CBC W/AUTO DIFF WBC: CPT | Performed by: NURSE PRACTITIONER

## 2021-09-02 PROCEDURE — 85610 PROTHROMBIN TIME: CPT | Performed by: NURSE PRACTITIONER

## 2021-09-02 PROCEDURE — 80061 LIPID PANEL: CPT | Performed by: NURSE PRACTITIONER

## 2021-09-02 PROCEDURE — 84484 ASSAY OF TROPONIN QUANT: CPT | Performed by: NURSE PRACTITIONER

## 2021-09-02 PROCEDURE — 85730 THROMBOPLASTIN TIME PARTIAL: CPT | Performed by: NURSE PRACTITIONER

## 2021-09-02 PROCEDURE — 80048 BASIC METABOLIC PNL TOTAL CA: CPT | Performed by: NURSE PRACTITIONER

## 2021-09-02 PROCEDURE — 36415 COLL VENOUS BLD VENIPUNCTURE: CPT | Performed by: NURSE PRACTITIONER

## 2021-09-02 RX ORDER — MELATONIN
1000 DAILY
Status: DISCONTINUED | OUTPATIENT
Start: 2021-09-02 | End: 2021-09-02 | Stop reason: HOSPADM

## 2021-09-02 RX ORDER — ONDANSETRON 2 MG/ML
4 INJECTION INTRAMUSCULAR; INTRAVENOUS EVERY 6 HOURS PRN
Status: DISCONTINUED | OUTPATIENT
Start: 2021-09-02 | End: 2021-09-02 | Stop reason: HOSPADM

## 2021-09-02 RX ORDER — SUCRALFATE 1 G/1
1 TABLET ORAL
Qty: 120 TABLET | Refills: 0 | Status: SHIPPED | OUTPATIENT
Start: 2021-09-02 | End: 2021-10-02

## 2021-09-02 RX ORDER — KETOROLAC TROMETHAMINE 30 MG/ML
30 INJECTION, SOLUTION INTRAMUSCULAR; INTRAVENOUS EVERY 6 HOURS PRN
Status: DISCONTINUED | OUTPATIENT
Start: 2021-09-02 | End: 2021-09-02 | Stop reason: HOSPADM

## 2021-09-02 RX ORDER — SODIUM CHLORIDE 0.9 % (FLUSH) 0.9 %
10 SYRINGE (ML) INJECTION EVERY 12 HOURS SCHEDULED
Status: DISCONTINUED | OUTPATIENT
Start: 2021-09-02 | End: 2021-09-02 | Stop reason: HOSPADM

## 2021-09-02 RX ORDER — ACETAMINOPHEN 160 MG/5ML
650 SOLUTION ORAL EVERY 4 HOURS PRN
Status: DISCONTINUED | OUTPATIENT
Start: 2021-09-02 | End: 2021-09-02 | Stop reason: HOSPADM

## 2021-09-02 RX ORDER — MAGNESIUM SULFATE 1 G/100ML
1 INJECTION INTRAVENOUS AS NEEDED
Status: DISCONTINUED | OUTPATIENT
Start: 2021-09-02 | End: 2021-09-02 | Stop reason: HOSPADM

## 2021-09-02 RX ORDER — ONDANSETRON 4 MG/1
4 TABLET, FILM COATED ORAL EVERY 6 HOURS PRN
Status: DISCONTINUED | OUTPATIENT
Start: 2021-09-02 | End: 2021-09-02 | Stop reason: HOSPADM

## 2021-09-02 RX ORDER — MAGNESIUM SULFATE HEPTAHYDRATE 40 MG/ML
2 INJECTION, SOLUTION INTRAVENOUS AS NEEDED
Status: DISCONTINUED | OUTPATIENT
Start: 2021-09-02 | End: 2021-09-02 | Stop reason: HOSPADM

## 2021-09-02 RX ORDER — SODIUM CHLORIDE 0.9 % (FLUSH) 0.9 %
10 SYRINGE (ML) INJECTION AS NEEDED
Status: DISCONTINUED | OUTPATIENT
Start: 2021-09-02 | End: 2021-09-02 | Stop reason: HOSPADM

## 2021-09-02 RX ORDER — SUCRALFATE 1 G/1
1 TABLET ORAL
Status: DISCONTINUED | OUTPATIENT
Start: 2021-09-02 | End: 2021-09-02 | Stop reason: HOSPADM

## 2021-09-02 RX ORDER — ACETAMINOPHEN 650 MG/1
650 SUPPOSITORY RECTAL EVERY 4 HOURS PRN
Status: DISCONTINUED | OUTPATIENT
Start: 2021-09-02 | End: 2021-09-02 | Stop reason: HOSPADM

## 2021-09-02 RX ORDER — SODIUM CHLORIDE 9 MG/ML
75 INJECTION, SOLUTION INTRAVENOUS CONTINUOUS
Status: DISCONTINUED | OUTPATIENT
Start: 2021-09-02 | End: 2021-09-02 | Stop reason: HOSPADM

## 2021-09-02 RX ORDER — PANTOPRAZOLE SODIUM 40 MG/1
40 TABLET, DELAYED RELEASE ORAL
Status: DISCONTINUED | OUTPATIENT
Start: 2021-09-03 | End: 2021-09-02 | Stop reason: HOSPADM

## 2021-09-02 RX ORDER — ACETAMINOPHEN 325 MG/1
650 TABLET ORAL EVERY 4 HOURS PRN
Status: DISCONTINUED | OUTPATIENT
Start: 2021-09-02 | End: 2021-09-02 | Stop reason: HOSPADM

## 2021-09-02 RX ORDER — POTASSIUM CHLORIDE 20 MEQ/1
40 TABLET, EXTENDED RELEASE ORAL AS NEEDED
Status: DISCONTINUED | OUTPATIENT
Start: 2021-09-02 | End: 2021-09-02 | Stop reason: HOSPADM

## 2021-09-02 RX ORDER — PANTOPRAZOLE SODIUM 40 MG/1
40 TABLET, DELAYED RELEASE ORAL
Qty: 30 TABLET | Refills: 0 | Status: SHIPPED | OUTPATIENT
Start: 2021-09-03 | End: 2021-10-03

## 2021-09-02 RX ADMIN — SODIUM CHLORIDE 75 ML/HR: 9 INJECTION, SOLUTION INTRAVENOUS at 04:12

## 2021-09-02 RX ADMIN — Medication 10 ML: at 09:23

## 2021-09-02 RX ADMIN — Medication 1000 UNITS: at 09:23

## 2021-09-02 RX ADMIN — SUCRALFATE 1 G: 1 TABLET ORAL at 16:59

## 2021-09-02 NOTE — CASE MANAGEMENT/SOCIAL WORK
Discharge Planning Assessment  [facility]     Patient Name: Abner Champion  MRN: [id number]  Today's Date: 9/2/2021    Admit Date: 9/1/2021    Discharge Needs Assessment     Row Name 09/02/21 1520       Living Environment    Lives With  spouse    Name(s) of Who Lives With Patient  Spouse- April    Current Living Arrangements  home/apartment/condo    Primary Care Provided by  self    Provides Primary Care For  no one    Family Caregiver if Needed  spouse    Quality of Family Relationships  supportive;involved;helpful    Able to Return to Prior Arrangements  yes       Resource/Environmental Concerns    Resource/Environmental Concerns  none    Transportation Concerns  car, none       Transition Planning    Patient/Family Anticipates Transition to  home with family    Patient/Family Anticipated Services at Transition  none    Transportation Anticipated  family or friend will provide;car, drives self       Discharge Needs Assessment    Readmission Within the Last 30 Days  no previous admission in last 30 days    Equipment Currently Used at Home  none    Concerns to be Addressed  no discharge needs identified;denies needs/concerns at this time    Anticipated Changes Related to Illness  none    Equipment Needed After Discharge  none    Provided Post Acute Provider List?  Refused        Discharge Plan     Row Name 09/02/21 2159       Plan    Plan  DC Plan: Anticipate routine home, declined needs at this time.    Patient/Family in Agreement with Plan  yes    Plan Comments  CM met with patient at bedside to discuss dc planning. PCP confirmed (Mina Marques). Reported no trouble affording medications. Declined needs for any DME or HH services at this time.        Demographic Summary     Row Name 09/02/21 1520       General Information    Admission Type  observation    Referral Source  admission list    Reason for Consult  discharge planning    Preferred Language  English     Used During This Interaction  no       Contact  Information    Permission Granted to Share Info With          Functional Status     Row Name 09/02/21 1520       Functional Status    Usual Activity Tolerance  good    Current Activity Tolerance  good       Functional Status, IADL    Medications  independent    Meal Preparation  independent    Housekeeping  independent    Laundry  independent    Shopping  independent       Employment/    Employment Status  employed full-time        Met with patient in room wearing PPE: mask/goggles.      Maintained distance greater than six feet and spent less than 15 minutes in the room.      Kathleen Francois RN     Office Phone: [phone]  Office Cell: [phone]

## 2021-09-02 NOTE — CONSULTS
GI CONSULT  NOTE:    Referring Provider:  Dr. Pierre    Chief complaint: Abdominal pain    Subjective .     History of present illness: Patient is a 41-year-old male with history of hernia repair as an infant who presented to the hospital yesterday with complaints of abdominal pain.  Pain began in the upper abdomen/epigastric area 2 nights ago after he had eaten dinner he had eaten food from a restaurant a few days prior but none on the day of or day before his pain began.  Denies being around any ill contacts.  He does use occasional Advil but not frequently.  Only new medication recently was Cialis.  No recent antibiotics.  Did report nausea and vomiting with this along with significant acid reflux.  He induced vomiting on one occasion and this did improve his abdominal pain.  He normally moves his bowels daily but has not had a bowel movement since the pain began.  No rectal bleeding.      Endo History:  No previous EGD or colonoscopy.    Past Medical History:  Past Medical History:   Diagnosis Date   • Low back pain        Past Surgical History:  Past Surgical History:   Procedure Laterality Date   • HERNIA REPAIR      as a baby   • KNEE SURGERY         Social History:  Social History     Tobacco Use   • Smoking status: Former Smoker     Years: 15.00     Types: Cigarettes   • Smokeless tobacco: Current User     Types: Chew   Substance Use Topics   • Alcohol use: Yes     Comment: rare   • Drug use: Never       Family History:  Family History   Problem Relation Age of Onset   • Diabetes Paternal Uncle    • Other Paternal Grandfather         memory issues    • Heart disease Paternal Grandfather    • Cancer Paternal Grandfather        Medications:  Medications Prior to Admission   Medication Sig Dispense Refill Last Dose   • B-Complex tablet Take 1 tablet by mouth Daily.      • cholecalciferol (VITAMIN D3) 25 MCG (1000 UT) tablet Take 1,000 Units by mouth Daily.      • tadalafil (CIALIS) 10 MG tablet Take 1 tablet by  "mouth Daily As Needed for Erectile Dysfunction. 90 tablet 2        Scheduled Meds:cholecalciferol, 1,000 Units, Oral, Daily  sodium chloride, 10 mL, Intravenous, Q12H      Continuous Infusions:sodium chloride, 75 mL/hr, Last Rate: 75 mL/hr (09/02/21 3732)      PRN Meds:.•  acetaminophen **OR** acetaminophen **OR** acetaminophen  •  ketorolac  •  magnesium sulfate **OR** magnesium sulfate in D5W 1g/100mL (PREMIX)  •  ondansetron **OR** ondansetron  •  potassium chloride  •  sodium chloride  •  [COMPLETED] Insert peripheral IV **AND** sodium chloride  •  sodium chloride    ALLERGIES:  Patient has no known allergies.    ROS:  Review of Systems   Constitutional: Negative for chills and fever.   HENT: Negative for congestion and trouble swallowing.    Respiratory: Negative for cough and shortness of breath.    Cardiovascular: Negative for chest pain and palpitations.   Gastrointestinal: Positive for abdominal pain, nausea and vomiting. Negative for blood in stool and diarrhea.   Musculoskeletal: Negative for arthralgias.   Neurological: Negative for dizziness and weakness.   Psychiatric/Behavioral: Negative for agitation and confusion.       Objective     Vital Signs:   Visit Vitals  /74 (BP Location: Left arm, Patient Position: Lying)   Pulse 62   Temp 98 °F (36.7 °C) (Oral)   Resp 16   Ht 182.9 cm (72.01\")   Wt 97 kg (213 lb 13.5 oz)   SpO2 99%   BMI 29.00 kg/m²       Physical Exam:      General Appearance:    Awake and alert, in no acute distress   Head:    Normocephalic, without obvious abnormality, atraumatic   Eyes:            Conjunctivae normal, anicteric sclera   Ears:    Ears appear intact with no abnormalities noted   Throat:   No oral lesions, no thrush, oral mucosa moist   Neck:   No adenopathy, supple, no thyromegaly, no JVD   Lungs:     Respirations regular, even and unlabored       Chest Wall:    No abnormalities observed   Abdomen:     Soft, non-tender, no rebound or guarding, non-distended, no " hepatosplenomegaly   Rectal:     Deferred   Extremities:   Moves all extremities well, no edema, no cyanosis, no             redness   Pulses:   Pulses palpable and equal bilaterally   Skin:   No bleeding, bruising or rash, no jaundice   Lymph nodes:   No palpable adenopathy   Neurologic:   Cranial nerves 2 - 12 grossly intact, no asterixis, sensation intact       Results Review:   I reviewed the patient's labs and imaging.  CBC  Results from last 7 days   Lab Units 09/02/21 0615 09/01/21 2021 08/30/21  1019   RBC 10*6/mm3 4.62 5.32 5.17   WBC 10*3/mm3 8.20 14.60* 7.4   HEMOGLOBIN g/dL 14.5 16.1 15.7   PLATELETS 10*3/mm3 257 331 337       CMP  Results from last 7 days   Lab Units 09/02/21 0615 09/01/21 2021 08/30/21  1019   SODIUM mmol/L 140 138 142   POTASSIUM mmol/L 4.5 3.8 4.9   CHLORIDE mmol/L 104 101 102   TOTAL CO2 mmol/L  --   --  25   CO2 mmol/L 27.0 21.0*  --    BUN mg/dL 15 20 14   CREATININE mg/dL 1.12 1.16 0.96   GLUCOSE mg/dL 96 133*  --    ALBUMIN g/dL  --  4.80 4.7   BILIRUBIN mg/dL  --  0.6 0.4   ALK PHOS U/L  --  77 77   AST (SGOT) U/L  --  18 18   ALT (SGPT) U/L  --  15 18       Amylase and Lipase  Results from last 7 days   Lab Units 09/01/21 2021   LIPASE U/L 22       CRP     Results from last 7 days   Lab Units 09/01/21 2021   CRP mg/dL 0.80*       Imaging Results (Last 24 Hours)     Procedure Component Value Units Date/Time    CT Abdomen Pelvis With Contrast [[id number]] Collected: 09/01/21 2155     Updated: 09/01/21 2210    Narrative:      CT ABDOMEN PELVIS W CONTRAST-     Date of Exam: 9/1/2021 9:46 PM     Indication: epigastric and periumbilical pain.     Comparison: None available.     Technique: Contiguous axial CT images were obtained from the lung bases  to the superior iliac crests without contrast.  Following uneventful IV  administration of 100 Isovue-370 and oral contrast, contiguous axial  images obtained from lung bases to the pubic symphysis. Sagittal and  coronal  reconstructions were performed.  Automated exposure control and  iterative reconstruction methods were used.     FINDINGS:  The lung bases are clear. No abnormality is seen in the liver,  gallbladder, adrenals, spleen, pancreas, or the mesenteric vessels show  no abnormality. Aorta. The kidneys appear normal. No ureteral stone or  gross bladder abnormality. No adenopathy is seen.     There is a small hiatal hernia. The stomach contains a moderate amount  of fluid. The proximal jejunum is fluid-filled and mildly distended.  There is thickening and increased density of the wall of several of the  mid jejunal loops. There is minimal adjacent mesenteric stranding.   There is subtle wall thickening of a loop of small bowel in the right  abdomen with some minor mesenteric stranding..      The appendix appears normal. There is fluid in the ascending,  transverse, and proximal-mid descending colon. There is sigmoid  diverticulosis.     There is a small amount of free fluid in the pelvis. There is no free  air.     Bone windows show degenerative disc disease at L5-S1.       Impression:      1. There are abnormalities in the small bowel. A few loops of mid  jejunum show wall thickening, which could be due to inflammation,  enteritis, or bowel wall hemorrhage. There is slight distention of the  jejunum proximal to this.  2.  Separate from the above-described finding,, there is subtle  thickening of the wall of a small bowel loop in the right abdomen with  some mild mesenteric stranding, with a similar differential. The  terminal ileum does not look particularly abnormal.  3. There is fluid throughout much of the small bowel and in much of the  colon, suggesting enteritis-colitis.  4. There is a small amount of free fluid, abnormal for a male patient,  which is nonspecific.  5. Negative for appendicitis.     Electronically Signed By-Eri Almonte MD On:9/1/2021 10:08 PM  This report was finalized on [id number] by  Eri  MD Gage.            ASSESSMENT AND PLAN:    Epigastric pain  Nausea/vomiting  Dyspepsia  Abnormal CT suggesting thickening of small bowel, fluid-filled small bowel and colon  History of hernia repair as an infant    Principal Problem:    Abdominal pain  Active Problems:    Hyperlipidemia    Hyponatremia     Plan:  41-year-old male presented to the hospital yesterday with epigastric pain, nausea/vomiting, and dyspepsia which began suddenly after dinner 2 nights ago.  CT shows thickening of jejunum and a separate area of thickening of the small bowel but terminal ileum appears normal.  Liver enzymes and lipase normal.  CRP 0.8.  No family history of inflammatory bowel disease and symptoms occurred suddenly.  He had no associated diarrhea.  Consider infectious etiology, medication induced, or adhesions.  He is feeling much better with no abdominal pain at this time and no further nausea/vomiting.  Only complaint is of acid reflux.  He has been tolerating clear liquids well.  Recommend starting pantoprazole/Carafate and consider advancing diet.  If patient feels well he would like to follow-up as outpatient.  Otherwise we can consider endoscopy.    I discussed the patients findings and my recommendations with the patient.  Breana Barrientos, ROSS  09/02/21  15:16 EDT

## 2021-09-02 NOTE — ED PROVIDER NOTES
Subjective   Chief Complaint: Abdominal pain    Patient is a 41-year-old  male no significant past medical history presents the ER with complaints of epigastric abdominal pain since last night.  He reports some acid reflux last night after eating dinner, states he has had epigastric and periumbilical abdominal pain since then.  Describes as a squeezing and sharp pain in his upper abdomen occasionally radiates into his chest.  Patient states pain has been a 9/10 most of today, however states that since being in the ER his pain has improved to a 4/10.  He reports nausea and 2 episodes of vomiting earlier today, no diarrhea.  Patient reports decreased urine output but states he has not been able to drink anything today.  He denies chest pain shortness of breath cough or headache at this time.  No fever or chills.  He denies any previous abdominal surgical history.    Location: Epigastric abdomen    Quality: Squeezing, sharp    Duration: 1 day    Timing: Intermittent    Severity: Mild to moderate    Associated Symptoms: Nausea vomiting    PCP: Mina Marques      History provided by:  Patient      Review of Systems   Constitutional: Negative for chills and fever.   HENT: Negative for sore throat and trouble swallowing.    Respiratory: Negative for shortness of breath and wheezing.    Cardiovascular: Negative for chest pain.   Gastrointestinal: Positive for abdominal pain, nausea and vomiting. Negative for diarrhea.   Genitourinary: Negative for dysuria.   Musculoskeletal: Negative for myalgias.   Skin: Negative for rash.   Neurological: Negative for weakness, light-headedness and headaches.   Psychiatric/Behavioral: Negative for behavioral problems.   All other systems reviewed and are negative.      Past Medical History:   Diagnosis Date   • Low back pain        No Known Allergies    Past Surgical History:   Procedure Laterality Date   • HERNIA REPAIR      as a baby   • KNEE SURGERY         Family History    Problem Relation Age of Onset   • Diabetes Paternal Uncle    • Other Paternal Grandfather         memory issues    • Heart disease Paternal Grandfather    • Cancer Paternal Grandfather        Social History     Socioeconomic History   • Marital status:      Spouse name: Not on file   • Number of children: Not on file   • Years of education: Not on file   • Highest education level: Not on file   Tobacco Use   • Smoking status: Former Smoker     Years: 15.00     Types: Cigarettes   • Smokeless tobacco: Current User     Types: Chew   Substance and Sexual Activity   • Alcohol use: Yes     Comment: rare   • Drug use: Never   • Sexual activity: Defer           Objective   Physical Exam  Vitals and nursing note reviewed.   Constitutional:       General: He is not in acute distress.     Appearance: He is well-developed and normal weight. He is not diaphoretic.   HENT:      Head: Normocephalic and atraumatic.      Mouth/Throat:      Mouth: Mucous membranes are moist.   Eyes:      Extraocular Movements: Extraocular movements intact.      Pupils: Pupils are equal, round, and reactive to light.   Cardiovascular:      Rate and Rhythm: Normal rate and regular rhythm.      Heart sounds: Normal heart sounds. No murmur heard.     Pulmonary:      Effort: Pulmonary effort is normal.      Breath sounds: Normal breath sounds.   Abdominal:      General: Abdomen is flat. Bowel sounds are normal.      Palpations: Abdomen is soft.      Tenderness: There is abdominal tenderness in the periumbilical area.   Skin:     Capillary Refill: Capillary refill takes less than 2 seconds.   Neurological:      General: No focal deficit present.      Mental Status: He is alert and oriented to person, place, and time.      Cranial Nerves: No cranial nerve deficit.   Psychiatric:         Mood and Affect: Mood normal.         Behavior: Behavior normal.         Procedures           ED Course  ED Course as of Sep 02 0022   Wed Sep 01, 2021   [id number]  "Spoke with Negar NP who agreed accept patient for Dr. Pierre.    [MM]      ED Course User Index  [MM] Stacy Desir PA    /85   Pulse 64   Temp 98.8 °F (37.1 °C) (Oral)   Resp 16   Ht 182.9 cm (72\")   Wt 96.5 kg (212 lb 11.9 oz)   SpO2 97%   BMI 28.85 kg/m²   Labs Reviewed   COMPREHENSIVE METABOLIC PANEL - Abnormal; Notable for the following components:       Result Value    Glucose 133 (*)     CO2 21.0 (*)     Anion Gap 16.0 (*)     All other components within normal limits    Narrative:     GFR Normal >60  Chronic Kidney Disease <60  Kidney Failure <15     URINALYSIS W/ MICROSCOPIC IF INDICATED (NO CULTURE) - Abnormal; Notable for the following components:    Color, UA Dark Yellow (*)     Appearance, UA Turbid (*)     Specific Gravity, UA 1.036 (*)     Ketones, UA 15 mg/dL (1+) (*)     Protein, UA Trace (*)     All other components within normal limits    Narrative:     Urine microscopic not indicated.   CBC WITH AUTO DIFFERENTIAL - Abnormal; Notable for the following components:    WBC 14.60 (*)     Neutrophil % 86.6 (*)     Lymphocyte % 7.3 (*)     Eosinophil % 0.1 (*)     Neutrophils, Absolute 12.60 (*)     All other components within normal limits   RESPIRATORY PANEL PCR W/ COVID-19 (SARS-COV-2) [facility] IN-HOUSE, NP SWAB IN UTM/VTP, 3-4 HR TAT - Normal    Narrative:     In the setting of a positive respiratory panel with a viral infection PLUS a negative procalcitonin without other underlying concern for bacterial infection, consider observing off antibiotics or discontinuation of antibiotics and continue supportive care. If the respiratory panel is positive for atypical bacterial infection (Bordetella pertussis, Chlamydophila pneumoniae, or Mycoplasma pneumoniae), consider antibiotic de-escalation to target atypical bacterial infection.   LIPASE - Normal   TROPONIN (IN-HOUSE) - Normal    Narrative:     Troponin T Reference Range:  <= 0.03 ng/mL-   Negative for AMI  >0.03 " ng/mL-     Abnormal for myocardial necrosis.  Clinicians would have to utilize clinical acumen, EKG, Troponin and serial changes to determine if it is an Acute Myocardial Infarction or myocardial injury due to an underlying chronic condition.       Results may be falsely decreased if patient taking Biotin.     SEDIMENTATION RATE - Normal   RAINBOW DRAW    Narrative:     The following orders were created for panel order [place] Draw.  Procedure                               Abnormality         Status                     ---------                               -----------         ------                     Green Top (Gel)[[id number]]                                  Final result               Lavender Top[[id number]]                                     Final result               Gold Top - SST[[id number]]                                   Final result               Light Blue Top[[id number]]                                   Final result                 Please view results for these tests on the individual orders.   PROTIME-INR   APTT   C-REACTIVE PROTEIN   PROCALCITONIN   TROPONIN (IN-HOUSE)   BASIC METABOLIC PANEL   BASIC METABOLIC PANEL   CBC WITH AUTO DIFFERENTIAL   CBC WITH AUTO DIFFERENTIAL   CBC AND DIFFERENTIAL    Narrative:     The following orders were created for panel order CBC & Differential.  Procedure                               Abnormality         Status                     ---------                               -----------         ------                     CBC Auto Differential[[id number]]        Abnormal            Final result                 Please view results for these tests on the individual orders.   GREEN TOP   LAVENDER TOP   GOLD TOP - SST   LIGHT BLUE TOP   CBC AND DIFFERENTIAL    Narrative:     The following orders were created for panel order CBC & Differential.  Procedure                               Abnormality         Status                     ---------                                -----------         ------                     CBC Auto Differential[[id number]]                                                         Please view results for these tests on the individual orders.   CBC AND DIFFERENTIAL    Narrative:     The following orders were created for panel order CBC & Differential.  Procedure                               Abnormality         Status                     ---------                               -----------         ------                     CBC Auto Differential[[id number]]                                                         Please view results for these tests on the individual orders.     Medications   sodium chloride 0.9 % flush 10 mL (has no administration in time range)   sodium chloride 0.9 % flush 10 mL (has no administration in time range)   potassium chloride (K-DUR,KLOR-CON) CR tablet 40 mEq (has no administration in time range)   Magnesium Sulfate 2 gram infusion - Mg less than or equal to 1.5 mg/dL (has no administration in time range)     Or   Magnesium Sulfate 1 gram infusion - Mg 1.6-1.9 mg/dL (has no administration in time range)   sodium chloride 0.9 % flush 10 mL (has no administration in time range)   sodium chloride 0.9 % flush 10 mL (has no administration in time range)   acetaminophen (TYLENOL) tablet 650 mg (has no administration in time range)     Or   acetaminophen (TYLENOL) 160 MG/5ML solution 650 mg (has no administration in time range)     Or   acetaminophen (TYLENOL) suppository 650 mg (has no administration in time range)   ondansetron (ZOFRAN) tablet 4 mg (has no administration in time range)     Or   ondansetron (ZOFRAN) injection 4 mg (has no administration in time range)   ketorolac (TORADOL) injection 30 mg (has no administration in time range)   sodium chloride 0.9 % infusion (has no administration in time range)   sodium chloride 0.9 % bolus 1,000 mL (0 mL Intravenous Stopped 9/1/21 8344)   iopamidol (ISOVUE-370) 76 % injection 100 mL (100  mL Intravenous Given 9/1/21 9868)     CT Abdomen Pelvis With Contrast    Result Date: 9/1/2021  1. There are abnormalities in the small bowel. A few loops of mid jejunum show wall thickening, which could be due to inflammation, enteritis, or bowel wall hemorrhage. There is slight distention of the jejunum proximal to this. 2.  Separate from the above-described finding,, there is subtle thickening of the wall of a small bowel loop in the right abdomen with some mild mesenteric stranding, with a similar differential. The terminal ileum does not look particularly abnormal. 3. There is fluid throughout much of the small bowel and in much of the colon, suggesting enteritis-colitis. 4. There is a small amount of free fluid, abnormal for a male patient, which is nonspecific. 5. Negative for appendicitis.  Electronically Signed By-Eri Almonte MD On:9/1/2021 10:08 PM This report was finalized on [id number] by  Eri Almonte MD.                                           MDM  Number of Diagnoses or Management Options  Abdominal pain, unspecified abdominal location  Leukocytosis, unspecified type  Nausea and vomiting, intractability of vomiting not specified, unspecified vomiting type  Diagnosis management comments: MEDICAL DECISION  Epic Chart Review:  Comorbidities: Patient denies  Differentials: Gastritis, ulcer, gastroparesis, diverticulitis, small bowel obstruction; this list is not all inclusive and does not constitute the entirety of considered causes  Radiology interpretation:  Images reviewed by me and interpreted by radiologist, as above  Lab interpretation:  Labs viewed by me significant for, as above  EKG interpretation: Reviewed myself interpreted by Dr. Loyd, normal sinus rhythm with rate of 70 with no acute ST change    While in the ED IV was placed and labs were obtained appropriate PPE was worn during exam and throughout all encounters with the patient.  Patient had the above evaluation.  IV established  and lab work obtained.  Patient placed on continuous cardiac monitoring throughout entire ER stay.  Physical exam unremarkable, benign abdominal exam.  Patient reports severe abdominal pain this morning, states that it has somewhat subsided since arriving in the ER.  EKG normal sinus rhythm with rate of 70 with no acute ST changes.  Lab work relatively unremarkable except for mild leukocytosis 14.6.  Kidney function, LFTs essentially normal.  Urinalysis negative for UTI.  CT abdomen pelvis abnormal, few loops of mid jejunum show wall thickening could be due to inflammation, enteritis or bowel wall hemorrhage, there is slight distention of the jejunum proximal to this.  There is also a subtle thickening of the wall of the small bowel loop in the right abdomen with some mild mesenteric stranding with similar differential.  Question possible IBD versus ulcerative colitis versus Crohn's.  Findings discussed with Dr. Loyd who recommended admission for GI consult.  Plan of admission was discussed with patient bedside who are agreeable to plan of admission.  Consult placed hospitalist, Spoke with MINESH Bills who agreed accept patient for Dr. Hayward.  Patient stable on admission.         Amount and/or Complexity of Data Reviewed  Clinical lab tests: ordered and reviewed  Tests in the radiology section of CPT®: ordered and reviewed    Patient Progress  Patient progress: stable      Final diagnoses:   Abdominal pain, unspecified abdominal location   Leukocytosis, unspecified type   Nausea and vomiting, intractability of vomiting not specified, unspecified vomiting type       ED Disposition  ED Disposition     ED Disposition Condition Comment    Decision to Admit  Level of Care: Med/Surg [1]   Diagnosis: Abdominal pain, unspecified abdominal location [[id number]]   Admitting Physician: SAADIA HAYWARD [[id number]]   Attending Physician: SAADIA HAYWARD [[id number]]            No follow-up provider specified.       Medication List      No changes were  made to your prescriptions during this visit.          Stacy Desir PA  09/02/21 0026

## 2021-09-02 NOTE — OUTREACH NOTE
Prep Survey      Responses   [facility] patient discharged from?  [facility]   Is LACE score < 7 ?  Yes   Emergency Room discharge w/ pulse ox?  No   Eligibility  TCM   Hospital  [facility]   Date of Admission  09/01/21   Date of Discharge  09/02/21   Discharge Disposition  Home or Self Care   Discharge diagnosis  abdominal pain, colitis   Does the patient have one of the following disease processes/diagnoses(primary or secondary)?  Other   Does the patient have Home health ordered?  No   Is there a DME ordered?  No   Prep survey completed?  Yes          Devi Champion RN

## 2021-09-02 NOTE — PLAN OF CARE
Goal Outcome Evaluation:  Plan of Care Reviewed With: patient        Progress: improving  Outcome Summary: pt admitted early morning. no complaints of pain or nausea, just some heartburn after having clear liquid tray. GI consulted, waiting on them to round this afternoon.

## 2021-09-02 NOTE — DISCHARGE SUMMARY
[facility] Medicine Services  DISCHARGE SUMMARY    Patient Name: Abner Champion  : 1980  MRN: [id number]    Date of Admission: 2021  Date of Discharge:   21, 6:47 PM EDT    Primary Care Physician: Mina Marques Sr., MD      Presenting Problem:   Abdominal pain, unspecified abdominal location [R10.9]  Leukocytosis, unspecified type [D72.829]  Nausea and vomiting, intractability of vomiting not specified, unspecified vomiting type [R11.2]    Active and Resolved Hospital Problems:  Active Hospital Problems    Diagnosis POA   • **Abdominal pain [R10.9] Yes     Priority: High   • Foot drop, left [M21.372] Yes   • Mixed hyperlipidemia [E78.2] Yes   • Tobacco use disorder [F17.200] Yes      Resolved Hospital Problems   No resolved problems to display.         Hospital Course     Hospital Course:  Abner Champion is a 41 y.o. male Abner Champion is a 41 y.o. male with past medical history of hyperlipidemia who presented to [facility] on 2021 complaining of upper abdominal pain.  Patient stated abdominal pain started after he ate supper on 2021.  Rates pain 3 on sliding scale 0-10.  Describes pain as sharp and constant.  Pain is worse when ambulating, and laying down.  Patient complains of heartburn, nausea, vomiting.  Patient denies chest pain, shortness of air, cough, fever, chills.  Patient's last bowel movement was on 2021.  Patient denies diarrhea, constipation, change in bowel habits.  Patient stated he started taking Cialis on 2021.     In the ED, CT abdomen pelvis showed diffuse loops of mid jejunum wall thickening, could be due to inflammation, enteritis, or bowel wall hemorrhage; subtle thickening of the wall the small bowel loops in the right abdomen with some mild mesenteric stranding; fluid throughout much of the small bowel and and much of the colon, suggesting enteritis-colitis; a small amount of free fluid; negative for appendicitis.   EKG showed sinus rhythm.  Urinalysis showed ketones, protein.  All labs unremarkable except WBC 14.6, glucose 133, CO2 29, anion gap 16.  All vital signs unremarkable.  Patient received IV fluid bolus in the ED.  Patient admitted to hospitalist group for further evaluation and treatment.    Patient abdomen pain has resolved he is eating regular diet. No more vomiting or diarrhea. Seen by GI.     ROS:  Constitutional: Negative for chills, fever and malaise/fatigue.   HENT: Negative.    Eyes: Negative.    Cardiovascular: Negative.  Negative for chest pain.   Respiratory: Negative.  Negative for cough and shortness of breath.    Endocrine: Negative.    Skin: Negative.    Musculoskeletal: Negative.    Gastrointestinal: Positive for abdominal pain, heartburn, nausea and vomiting. Negative for change in bowel habit, constipation and diarrhea.   Genitourinary: Negative.    Neurological: Positive for weakness.   Psychiatric/Behavioral: Negative.    Allergic/Immunologic: Negative.       Noted        DISCHARGE Follow Up Recommendations for labs and diagnostics: Follow with gastroenterology in 1 month      Reasons For Change In Medications and Indications for New Medications:      Day of Discharge     Vital Signs:  Temp:  [97.5 °F (36.4 °C)-98.2 °F (36.8 °C)] 97.5 °F (36.4 °C)  Heart Rate:  [56-91] 59  Resp:  [16-18] 18  BP: (108-157)/(70-90) 127/85    Physical Exam:  Physical Exam  Vitals and nursing note reviewed.   Constitutional:       General: He is not in acute distress.     Appearance: Normal appearance. He is well-developed. He is not ill-appearing, toxic-appearing or diaphoretic.   HENT:      Head: Normocephalic and atraumatic.      Right Ear: Ear canal and external ear normal.      Left Ear: Ear canal and external ear normal.      Nose: Nose normal. No congestion or rhinorrhea.      Mouth/Throat:      Mouth: Mucous membranes are moist.      Pharynx: No oropharyngeal exudate.   Eyes:      General: No scleral icterus.         Right eye: No discharge.         Left eye: No discharge.      Extraocular Movements: Extraocular movements intact.      Conjunctiva/sclera: Conjunctivae normal.      Pupils: Pupils are equal, round, and reactive to light.   Neck:      Thyroid: No thyromegaly.      Vascular: No carotid bruit or JVD.      Trachea: No tracheal deviation.   Cardiovascular:      Rate and Rhythm: Normal rate and regular rhythm.      Pulses: Normal pulses.      Heart sounds: Normal heart sounds. No murmur heard.   No friction rub. No gallop.    Pulmonary:      Effort: Pulmonary effort is normal. No respiratory distress.      Breath sounds: Normal breath sounds. No stridor. No wheezing, rhonchi or rales.   Chest:      Chest wall: No tenderness.   Abdominal:      General: Bowel sounds are normal. There is no distension.      Palpations: Abdomen is soft. There is no mass.      Tenderness: There is no abdominal tenderness. There is no guarding or rebound.      Hernia: No hernia is present.   Musculoskeletal:         General: No swelling, tenderness, deformity or signs of injury. Normal range of motion.      Cervical back: Normal range of motion and neck supple. No rigidity. No muscular tenderness.      Right lower leg: No edema.      Left lower leg: No edema.   Lymphadenopathy:      Cervical: No cervical adenopathy.   Skin:     General: Skin is warm and dry.      Coloration: Skin is not jaundiced or pale.      Findings: No bruising, erythema or rash.   Neurological:      General: No focal deficit present.      Mental Status: He is alert and oriented to person, place, and time. Mental status is at baseline.      Cranial Nerves: No cranial nerve deficit.      Sensory: No sensory deficit.      Motor: No weakness or abnormal muscle tone.      Coordination: Coordination normal.   Psychiatric:         Mood and Affect: Mood normal.         Behavior: Behavior normal.         Thought Content: Thought content normal.         Judgment: Judgment  normal.            Pertinent  and/or Most Recent Results     LAB RESULTS:      Lab 09/02/21 0615 09/01/21 2021 08/30/21  1019   WBC 8.20 14.60* 7.4   HEMOGLOBIN 14.5 16.1 15.7   HEMATOCRIT 41.7 46.5 47.1   PLATELETS 257 331 337   NEUTROS ABS 5.00 12.60* 4.1   LYMPHS ABS 2.10 1.10 2.2   MONOS ABS 0.80 0.80 0.7   EOS ABS 0.30 0.00 0.2   MCV 90.3 87.4 91   SED RATE  --  10  --    CRP  --  0.80*  --    PROCALCITONIN  --  0.05  --    PROTIME 10.9  --   --    APTT 27.2  --   --          Lab 09/02/21 0615 09/01/21 2021 08/30/21  1019   SODIUM 140 138 142   POTASSIUM 4.5 3.8 4.9   CHLORIDE 104 101 102   TOTAL CO2  --   --  25   CO2 27.0 21.0*  --    ANION GAP 9.0 16.0*  --    BUN 15 20 14   CREATININE 1.12 1.16 0.96   GLUCOSE 96 133*  --    CALCIUM 8.4* 9.7 9.8   TSH  --   --  2.180         Lab 09/01/21 2021 08/30/21  1019   TOTAL PROTEIN 7.8  --    ALBUMIN 4.80 4.7   GLOBULIN 3.0  --    ALT (SGPT) 15 18   AST (SGOT) 18 18   BILIRUBIN 0.6 0.4   ALK PHOS 77 77   LIPASE 22  --          Lab 09/02/21 0615 09/01/21 2021   TROPONIN T <0.010 <0.010   PROTIME 10.9  --    INR 0.98  --          Lab 09/02/21 0615 08/30/21  1019   CHOLESTEROL 196  --    LDL CHOL 142* 161*   HDL CHOL 37* 46   TRIGLYCERIDES 92 275*         Lab 08/30/21  1019   VITAMIN B 12 653         Brief Urine Lab Results  (Last result in the past 365 days)      Color   Clarity   Blood   Leuk Est   Nitrite   Protein   CREAT   Urine HCG        09/01/21 2127 Dark Yellow  Comment:  Result checked  Turbid  Comment:  Result checked  Negative Negative Negative Trace             Microbiology Results (last 10 days)     Procedure Component Value - Date/Time    Respiratory Panel PCR w/COVID-19(SARS-CoV-2) [facility] In-House, NP Swab in UTM/VTM, 3-4 HR TAT - Swab, Nasopharynx [[id number]]  (Normal) Collected: 09/01/21 2021    Lab Status: Final result Specimen: Swab from Nasopharynx Updated: 09/01/21 2148     ADENOVIRUS, PCR Not Detected     Coronavirus  229E Not Detected     Coronavirus HKU1 Not Detected     Coronavirus NL63 Not Detected     Coronavirus OC43 Not Detected     COVID19 Not Detected     Human Metapneumovirus Not Detected     Human Rhinovirus/Enterovirus Not Detected     Influenza A PCR Not Detected     Influenza B PCR Not Detected     Parainfluenza Virus 1 Not Detected     Parainfluenza Virus 2 Not Detected     Parainfluenza Virus 3 Not Detected     Parainfluenza Virus 4 Not Detected     RSV, PCR Not Detected     Bordetella pertussis pcr Not Detected     Bordetella parapertussis PCR Not Detected     Chlamydophila pneumoniae PCR Not Detected     Mycoplasma pneumo by PCR Not Detected    Narrative:      In the setting of a positive respiratory panel with a viral infection PLUS a negative procalcitonin without other underlying concern for bacterial infection, consider observing off antibiotics or discontinuation of antibiotics and continue supportive care. If the respiratory panel is positive for atypical bacterial infection (Bordetella pertussis, Chlamydophila pneumoniae, or Mycoplasma pneumoniae), consider antibiotic de-escalation to target atypical bacterial infection.          CT Abdomen Pelvis With Contrast    Result Date: 9/1/2021  Impression: 1. There are abnormalities in the small bowel. A few loops of mid jejunum show wall thickening, which could be due to inflammation, enteritis, or bowel wall hemorrhage. There is slight distention of the jejunum proximal to this. 2.  Separate from the above-described finding,, there is subtle thickening of the wall of a small bowel loop in the right abdomen with some mild mesenteric stranding, with a similar differential. The terminal ileum does not look particularly abnormal. 3. There is fluid throughout much of the small bowel and in much of the colon, suggesting enteritis-colitis. 4. There is a small amount of free fluid, abnormal for a male patient, which is nonspecific. 5. Negative for appendicitis.   Electronically Signed By-Eri Almonte MD On:9/1/2021 10:08 PM This report was finalized on [id number] by  Eri Almonte MD.                  Labs Pending at Discharge:      Procedures Performed           Consults:   Consults     Date and Time Order Name Status Description    9/2/2021 12:14 PM Inpatient Gastroenterology Consult              Discharge Details        Discharge Medications      New Medications      Instructions Start Date   pantoprazole 40 MG EC tablet  Commonly known as: PROTONIX   40 mg, Oral, Every Early Morning   Start Date: September 3, 2021     sucralfate 1 g tablet  Commonly known as: CARAFATE   1 g, Oral, 4 Times Daily Before Meals & Nightly         Continue These Medications      Instructions Start Date   B-Complex tablet   1 tablet, Oral, Daily      cholecalciferol 25 MCG (1000 UT) tablet  Commonly known as: VITAMIN D3   1,000 Units, Oral, Daily      tadalafil 10 MG tablet  Commonly known as: CIALIS   10 mg, Oral, Daily PRN             No Known Allergies      Discharge Disposition:   Home or Self Care    Diet:  Hospital:  Diet Order   Procedures   • Diet Gastrointestinal; Low Residue         Discharge Activity:   Activity Instructions     Activity as Tolerated              CODE STATUS:  Code Status and Medical Interventions:   Ordered at: 09/02/21 0029     Level Of Support Discussed With:    Patient     Code Status:    CPR     Medical Interventions (Level of Support Prior to Arrest):    Full         Future Appointments   Date Time Provider Department Center   9/17/2021 11:15 AM Mina Marques Sr., MD [facility]       Additional Instructions for the Follow-ups that You Need to Schedule     Discharge Follow-up with PCP   As directed       Currently Documented PCP:    Mina Marques Sr., MD    PCP Phone Number:    [phone]     Follow Up Details: If no PCP, call MD finder at [phone]                 This patient has been examined wearing appropriate Personal Protective Equipment .  09/02/21      Signature: Beck Pierre MD, FACP

## 2021-09-02 NOTE — H&P
[facility] Medicine Services      Patient Name: Abner Champion  : 1980  MRN: [id number]  Primary Care Physician:  Mina Marques Sr., MD  Date of admission: 2021      Subjective      Chief Complaint: Abdominal pain    History of Present Illness: Abner Champion is a 41 y.o. male with past medical history of hyperlipidemia who presented to [facility] on 2021 complaining of upper abdominal pain.  Patient stated abdominal pain started after he ate supper on 2021.  Rates pain 3 on sliding scale 0-10.  Describes pain as sharp and constant.  Pain is worse when ambulating, and laying down.  Patient complains of heartburn, nausea, vomiting.  Patient denies chest pain, shortness of air, cough, fever, chills.  Patient's last bowel movement was on 2021.  Patient denies diarrhea, constipation, change in bowel habits.  Patient stated he started taking Cialis on 2021.    In the ED, CT abdomen pelvis showed diffuse loops of mid jejunum wall thickening, could be due to inflammation, enteritis, or bowel wall hemorrhage; subtle thickening of the wall the small bowel loops in the right abdomen with some mild mesenteric stranding; fluid throughout much of the small bowel and and much of the colon, suggesting enteritis-colitis; a small amount of free fluid; negative for appendicitis.  EKG showed sinus rhythm.  Urinalysis showed ketones, protein.  All labs unremarkable except WBC 14.6, glucose 133, CO2 29, anion gap 16.  All vital signs unremarkable.  Patient received IV fluid bolus in the ED.  Patient admitted to hospitalist group for further evaluation and treatment.    Review of Systems   Constitutional: Negative for chills, fever and malaise/fatigue.   HENT: Negative.    Eyes: Negative.    Cardiovascular: Negative.  Negative for chest pain.   Respiratory: Negative.  Negative for cough and shortness of breath.    Endocrine: Negative.    Skin: Negative.    Musculoskeletal:  Negative.    Gastrointestinal: Positive for abdominal pain, heartburn, nausea and vomiting. Negative for change in bowel habit, constipation and diarrhea.   Genitourinary: Negative.    Neurological: Positive for weakness.   Psychiatric/Behavioral: Negative.    Allergic/Immunologic: Negative.        Personal History     Past Medical History:   Diagnosis Date   • Low back pain        Past Surgical History:   Procedure Laterality Date   • HERNIA REPAIR      as a baby   • KNEE SURGERY         Family History: family history includes Cancer in his paternal grandfather; Diabetes in his paternal uncle; Heart disease in his paternal grandfather; Other in his paternal grandfather. Otherwise pertinent FHx was reviewed and not pertinent to current issue.    Social History:  reports that he has quit smoking. His smoking use included cigarettes. He quit after 15.00 years of use. His smokeless tobacco use includes chew. He reports current alcohol use. He reports that he does not use drugs.    Home Medications:  Prior to Admission Medications     Prescriptions Last Dose Informant Patient Reported? Taking?    B Complex-Biotin-FA (B-COMPLEX PO)   Yes No    Take  by mouth.    cholecalciferol (VITAMIN D3) 25 MCG (1000 UT) tablet  Self Yes No    Take 1,000 Units by mouth Daily.    tadalafil (CIALIS) 10 MG tablet   No No    Take 1 tablet by mouth Daily As Needed for Erectile Dysfunction.            Allergies:  No Known Allergies    Objective      Vitals:   Temp:  [98.1 °F (36.7 °C)-98.8 °F (37.1 °C)] 98.8 °F (37.1 °C)  Heart Rate:  [64-95] 64  Resp:  [14-18] 16  BP: (110-157)/(76-90) 126/85    Physical Exam  Vitals and nursing note reviewed.   Constitutional:       Appearance: Normal appearance.   HENT:      Head: Normocephalic.      Nose: Nose normal.      Mouth/Throat:      Pharynx: Oropharynx is clear.   Eyes:      Extraocular Movements: Extraocular movements intact.   Cardiovascular:      Rate and Rhythm: Normal rate and regular  rhythm.      Pulses: Normal pulses.      Heart sounds: Normal heart sounds.   Pulmonary:      Effort: Pulmonary effort is normal.      Breath sounds: Normal breath sounds.   Abdominal:      General: Bowel sounds are normal.      Palpations: Abdomen is soft.   Musculoskeletal:         General: Normal range of motion.      Cervical back: Normal range of motion.   Skin:     General: Skin is warm and dry.   Neurological:      Mental Status: He is alert and oriented to person, place, and time.   Psychiatric:         Mood and Affect: Mood normal.         Behavior: Behavior normal.         Result Review    Result Review:  I have personally reviewed the results from the time of this admission to 9/2/2021 03:17 EDT and agree with these findings:  [x]  Laboratory  [x]  Microbiology  [x]  Radiology  [x]  EKG/Telemetry   []  Cardiology/Vascular   []  Pathology  []  Old records  []  Other:  Most notable findings include: As above      Assessment/Plan        Active Hospital Problems:  Active Hospital Problems    Diagnosis    • **Abdominal pain    • Hyperlipidemia      Plan:     Abdominal pain   -CT abdomen pelvis reviewed  -WBC 14.6  -EKG and troponin negative  -A.m. EKG and troponin ordered  -CRP, ESR, procalcitonin ordered  -PT/INR, PTT ordered  -Possible gastroenterology consult  -N.p.o  -Toradol as needed for pain    Hyperlipidemia  -Lipid panel ordered    Hyponatremia  -Sodium 133  -Normal saline IV fluid bolus given in the ED  -Normal saline infusing at 75 mL/h    DVT prophylaxis:  Mechanical DVT prophylaxis orders are present.    CODE STATUS:    Level Of Support Discussed With: Patient  Code Status: CPR  Medical Interventions (Level of Support Prior to Arrest): Full    Admission Status:  I believe this patient meets observation status.    I discussed the patient's findings and my recommendations with patient and family.    This patient has been examined wearing appropriate Personal Protective Equipment.  09/02/21      Signature: Electronically signed by ROSS Contreras, 09/02/21, 3:31 AM EDT.

## 2021-09-03 ENCOUNTER — TRANSITIONAL CARE MANAGEMENT TELEPHONE ENCOUNTER (OUTPATIENT)
Dept: CALL CENTER | Facility: HOSPITAL | Age: 41
End: 2021-09-03

## 2021-09-03 LAB — QT INTERVAL: 383 MS

## 2021-09-03 NOTE — OUTREACH NOTE
Call Center TCM Note      Responses   [facility] patient discharged from?  [facility]   Does the patient have one of the following disease processes/diagnoses(primary or secondary)?  Other   TCM attempt successful?  No   Unsuccessful attempts  Attempt 1          Ondina Orr RN    9/3/2021, 14:08 EDT

## 2021-09-03 NOTE — OUTREACH NOTE
Call Center TCM Note      Responses   [facility] patient discharged from?  [facility]   Does the patient have one of the following disease processes/diagnoses(primary or secondary)?  Other   TCM attempt successful?  No   Unsuccessful attempts  Attempt 2          Ondina Orr RN    9/3/2021, 14:22 EDT

## 2021-09-03 NOTE — CASE MANAGEMENT/SOCIAL WORK
Case Management Discharge Note          Selected Continued Care - Discharged on 9/2/2021 Admission date: 9/1/2021 - Discharge disposition: Home or Self Care     Final Discharge Disposition Code: 01 - home or self-care

## 2021-09-06 ENCOUNTER — TRANSITIONAL CARE MANAGEMENT TELEPHONE ENCOUNTER (OUTPATIENT)
Dept: CALL CENTER | Facility: HOSPITAL | Age: 41
End: 2021-09-06

## 2021-09-06 NOTE — OUTREACH NOTE
Call Center TCM Note      Responses   [facility] patient discharged from?  [facility]   Does the patient have one of the following disease processes/diagnoses(primary or secondary)?  Other   TCM attempt successful?  No   Unsuccessful attempts  Attempt 3          Stacy Allen RN    9/6/2021, 08:47 EDT

## 2021-09-10 ENCOUNTER — OFFICE VISIT (OUTPATIENT)
Dept: FAMILY MEDICINE CLINIC | Facility: CLINIC | Age: 41
End: 2021-09-10

## 2021-09-10 VITALS
WEIGHT: 219.8 LBS | HEIGHT: 70 IN | TEMPERATURE: 97.7 F | RESPIRATION RATE: 18 BRPM | SYSTOLIC BLOOD PRESSURE: 130 MMHG | HEART RATE: 120 BPM | DIASTOLIC BLOOD PRESSURE: 86 MMHG | OXYGEN SATURATION: 98 % | BODY MASS INDEX: 31.47 KG/M2

## 2021-09-10 DIAGNOSIS — K21.00 GASTROESOPHAGEAL REFLUX DISEASE WITH ESOPHAGITIS WITHOUT HEMORRHAGE: Primary | ICD-10-CM

## 2021-09-10 PROCEDURE — 99213 OFFICE O/P EST LOW 20 MIN: CPT | Performed by: FAMILY MEDICINE

## 2021-09-10 NOTE — PROGRESS NOTES
"Chief Complaint  lab work follow up    Subjective    History of Present Illness        Abner Champion presents to [facility] for   Patient was last seen in office on 08/30/2021. He is here today to follow up on lab work. On 09/01/2021 he was seen at [facility] for upper abdominal pain. Patient reports of no abdominal pain today in office. Medication changes from ER include pantroprazole 40 MG and Sucrafate 1 G.        Objective   Vital Signs:   Visit Vitals  /86 (BP Location: Right arm, Patient Position: Sitting, Cuff Size: Adult)   Pulse 120   Temp 97.7 °F (36.5 °C) (Temporal)   Resp 18   Ht 177.8 cm (70\")   Wt 99.7 kg (219 lb 12.8 oz)   SpO2 98% Comment: room air   BMI 31.54 kg/m²       Physical Exam  Vitals reviewed.   Constitutional:       Appearance: He is well-developed.   HENT:      Head: Normocephalic.      Right Ear: External ear normal.      Left Ear: External ear normal.      Nose: Nose normal.   Eyes:      Conjunctiva/sclera: Conjunctivae normal.   Cardiovascular:      Rate and Rhythm: Normal rate and regular rhythm.   Pulmonary:      Effort: Pulmonary effort is normal.      Breath sounds: Normal breath sounds.   Musculoskeletal:         General: Normal range of motion.      Cervical back: Normal range of motion and neck supple.   Skin:     General: Skin is warm and dry.      Capillary Refill: Capillary refill takes less than 2 seconds.   Neurological:      Mental Status: He is alert and oriented to person, place, and time.            Result Review :                    Assessment and Plan      Diagnoses and all orders for this visit:    1. Gastroesophageal reflux disease with esophagitis without hemorrhage (Primary)  Assessment & Plan:  Patient is currently on Carafate Protonix to help treat his symptoms.  Patient states his symptoms are improved since his ER visit.  No new treatments at this time.             Follow Up   No follow-ups on file.  Patient " was given instructions and counseling regarding his condition or for health maintenance advice. Please see specific information pulled into the AVS if appropriate.

## 2021-09-10 NOTE — ASSESSMENT & PLAN NOTE
Patient is currently on Carafate Protonix to help treat his symptoms.  Patient states his symptoms are improved since his ER visit.  No new treatments at this time.

## 2021-11-21 PROBLEM — Z20.822 SUSPECTED COVID-19 VIRUS INFECTION: Status: ACTIVE | Noted: 2021-11-21

## 2021-11-21 PROCEDURE — U0004 COV-19 TEST NON-CDC HGH THRU: HCPCS | Performed by: EMERGENCY MEDICINE

## 2022-02-14 ENCOUNTER — OFFICE VISIT (OUTPATIENT)
Dept: FAMILY MEDICINE CLINIC | Facility: CLINIC | Age: 42
End: 2022-02-14

## 2022-02-14 VITALS
BODY MASS INDEX: 29.69 KG/M2 | DIASTOLIC BLOOD PRESSURE: 60 MMHG | TEMPERATURE: 98.2 F | SYSTOLIC BLOOD PRESSURE: 118 MMHG | OXYGEN SATURATION: 100 % | WEIGHT: 219.2 LBS | HEART RATE: 59 BPM | RESPIRATION RATE: 16 BRPM | HEIGHT: 72 IN

## 2022-02-14 DIAGNOSIS — R53.81 MALAISE AND FATIGUE: ICD-10-CM

## 2022-02-14 DIAGNOSIS — E78.2 MIXED HYPERLIPIDEMIA: Primary | ICD-10-CM

## 2022-02-14 DIAGNOSIS — E55.9 VITAMIN D DEFICIENCY: ICD-10-CM

## 2022-02-14 DIAGNOSIS — R53.83 MALAISE AND FATIGUE: ICD-10-CM

## 2022-02-14 PROCEDURE — 99213 OFFICE O/P EST LOW 20 MIN: CPT | Performed by: FAMILY MEDICINE

## 2022-02-14 NOTE — PROGRESS NOTES
"Chief Complaint  Hyperlipidemia    Subjective    History of Present Illness        Abner Champion presents to [facility] for   Hyperlipidemia  This is a chronic problem. The current episode started more than 1 year ago. The problem is controlled. Recent lipid tests were reviewed and are variable. There are no known factors aggravating his hyperlipidemia. He is currently on no antihyperlipidemic treatment. Compliance problems include adherence to diet.  Risk factors for coronary artery disease include male sex.        Objective   Vital Signs:   Visit Vitals  /60 (BP Location: Right arm, Patient Position: Sitting, Cuff Size: Large Adult)   Pulse 59   Temp 98.2 °F (36.8 °C) (Skin)   Resp 16   Ht 182.9 cm (72\")   Wt 99.4 kg (219 lb 3.2 oz)   SpO2 100%   BMI 29.73 kg/m²       Physical Exam  Vitals reviewed.   Constitutional:       Appearance: He is well-developed.   HENT:      Head: Normocephalic.      Right Ear: External ear normal.      Left Ear: External ear normal.      Nose: Nose normal.   Eyes:      Conjunctiva/sclera: Conjunctivae normal.   Cardiovascular:      Rate and Rhythm: Normal rate and regular rhythm.   Pulmonary:      Effort: Pulmonary effort is normal.      Breath sounds: Normal breath sounds.   Musculoskeletal:         General: Normal range of motion.      Cervical back: Normal range of motion and neck supple.   Skin:     General: Skin is warm and dry.      Capillary Refill: Capillary refill takes less than 2 seconds.   Neurological:      Mental Status: He is alert and oriented to person, place, and time.            Result Review :  { Labs  Result Review  Imaging  Med Tab  Media :23}                   Assessment and Plan      Diagnoses and all orders for this visit:    1. Mixed hyperlipidemia (Primary)  Assessment & Plan:  Lipid abnormalities are unchanged.  Nutritional counseling was provided.  Lipids will be reassessed in 3 months.    Orders:  -     " Comprehensive metabolic panel  -     Lipid Panel With / Chol / HDL Ratio    2. Vitamin D deficiency  -     Vitamin D 25 hydroxy    3. Malaise and fatigue  -     CBC w AUTO Differential  -     TSH           Follow Up   No follow-ups on file.  Patient was given instructions and counseling regarding his condition or for health maintenance advice. Please see specific information pulled into the AVS if appropriate.

## 2022-02-15 LAB
25(OH)D3+25(OH)D2 SERPL-MCNC: 47.1 NG/ML (ref 30–100)
ALBUMIN SERPL-MCNC: 4.5 G/DL (ref 4–5)
ALBUMIN/GLOB SERPL: 1.6 {RATIO} (ref 1.2–2.2)
ALP SERPL-CCNC: 75 IU/L (ref 44–121)
ALT SERPL-CCNC: 14 IU/L (ref 0–44)
AST SERPL-CCNC: 17 IU/L (ref 0–40)
BASOPHILS # BLD AUTO: 0.1 X10E3/UL (ref 0–0.2)
BASOPHILS NFR BLD AUTO: 1 %
BILIRUB SERPL-MCNC: 0.3 MG/DL (ref 0–1.2)
BUN SERPL-MCNC: 20 MG/DL (ref 6–24)
BUN/CREAT SERPL: 20 (ref 9–20)
CALCIUM SERPL-MCNC: 9.7 MG/DL (ref 8.7–10.2)
CHLORIDE SERPL-SCNC: 103 MMOL/L (ref 96–106)
CHOLEST SERPL-MCNC: 241 MG/DL (ref 100–199)
CHOLEST/HDLC SERPL: 6.3 RATIO (ref 0–5)
CO2 SERPL-SCNC: 25 MMOL/L (ref 20–29)
CREAT SERPL-MCNC: 0.98 MG/DL (ref 0.76–1.27)
EOSINOPHIL # BLD AUTO: 0.3 X10E3/UL (ref 0–0.4)
EOSINOPHIL NFR BLD AUTO: 5 %
ERYTHROCYTE [DISTWIDTH] IN BLOOD BY AUTOMATED COUNT: 12.2 % (ref 11.6–15.4)
GLOBULIN SER CALC-MCNC: 2.9 G/DL (ref 1.5–4.5)
GLUCOSE SERPL-MCNC: 92 MG/DL (ref 65–99)
HCT VFR BLD AUTO: 46.9 % (ref 37.5–51)
HDLC SERPL-MCNC: 38 MG/DL
HGB BLD-MCNC: 16.1 G/DL (ref 13–17.7)
IMM GRANULOCYTES # BLD AUTO: 0 X10E3/UL (ref 0–0.1)
IMM GRANULOCYTES NFR BLD AUTO: 0 %
LDLC SERPL CALC-MCNC: 180 MG/DL (ref 0–99)
LYMPHOCYTES # BLD AUTO: 2.2 X10E3/UL (ref 0.7–3.1)
LYMPHOCYTES NFR BLD AUTO: 38 %
MCH RBC QN AUTO: 30.1 PG (ref 26.6–33)
MCHC RBC AUTO-ENTMCNC: 34.3 G/DL (ref 31.5–35.7)
MCV RBC AUTO: 88 FL (ref 79–97)
MONOCYTES # BLD AUTO: 0.6 X10E3/UL (ref 0.1–0.9)
MONOCYTES NFR BLD AUTO: 10 %
NEUTROPHILS # BLD AUTO: 2.7 X10E3/UL (ref 1.4–7)
NEUTROPHILS NFR BLD AUTO: 46 %
PLATELET # BLD AUTO: 328 X10E3/UL (ref 150–450)
POTASSIUM SERPL-SCNC: 5.3 MMOL/L (ref 3.5–5.2)
PROT SERPL-MCNC: 7.4 G/DL (ref 6–8.5)
RBC # BLD AUTO: 5.35 X10E6/UL (ref 4.14–5.8)
SODIUM SERPL-SCNC: 141 MMOL/L (ref 134–144)
TRIGL SERPL-MCNC: 124 MG/DL (ref 0–149)
TSH SERPL DL<=0.005 MIU/L-ACNC: 1.93 UIU/ML (ref 0.45–4.5)
VLDLC SERPL CALC-MCNC: 23 MG/DL (ref 5–40)
WBC # BLD AUTO: 5.8 X10E3/UL (ref 3.4–10.8)

## 2022-02-18 NOTE — PROGRESS NOTES
"Chief Complaint  Hyperlipidemia    Subjective    History of Present Illness        Abner Champion presents to [facility] for   Hyperlipidemia  This is a chronic problem. The current episode started more than 1 year ago. The problem is controlled. Recent lipid tests were reviewed and are variable. There are no known factors aggravating his hyperlipidemia. He is currently on no antihyperlipidemic treatment. Compliance problems include adherence to diet.  Risk factors for coronary artery disease include male sex.        Objective   Vital Signs:   Visit Vitals  /60 (BP Location: Right arm, Patient Position: Sitting, Cuff Size: Large Adult)   Pulse 73   Temp 98.2 °F (36.8 °C) (Skin)   Resp 16   Ht 182.9 cm (72\")   Wt 97.7 kg (215 lb 6.4 oz)   SpO2 98%   BMI 29.21 kg/m²       Physical Exam  Vitals reviewed.   Constitutional:       Appearance: He is well-developed.   HENT:      Head: Normocephalic.      Right Ear: External ear normal.      Left Ear: External ear normal.      Nose: Nose normal.   Eyes:      Conjunctiva/sclera: Conjunctivae normal.   Cardiovascular:      Rate and Rhythm: Normal rate and regular rhythm.   Pulmonary:      Effort: Pulmonary effort is normal.      Breath sounds: Normal breath sounds.   Musculoskeletal:         General: Normal range of motion.      Cervical back: Normal range of motion and neck supple.   Skin:     General: Skin is warm and dry.      Capillary Refill: Capillary refill takes less than 2 seconds.   Neurological:      Mental Status: He is alert and oriented to person, place, and time.            Result Review :                  Recent Results (from the past 1344 hour(s))   CBC w AUTO Differential    Collection Time: 02/14/22  9:50 AM    Specimen: Blood   Result Value Ref Range    WBC 5.8 3.4 - 10.8 x10E3/uL    RBC 5.35 4.14 - 5.80 x10E6/uL    Hemoglobin 16.1 13.0 - 17.7 g/dL    Hematocrit 46.9 37.5 - 51.0 %    MCV 88 79 - 97 fL    MCH 30.1 26.6 " - 33.0 pg    MCHC 34.3 31.5 - 35.7 g/dL    RDW 12.2 11.6 - 15.4 %    Platelets 328 150 - 450 x10E3/uL    Neutrophil Rel % 46 Not Estab. %    Lymphocyte Rel % 38 Not Estab. %    Monocyte Rel % 10 Not Estab. %    Eosinophil Rel % 5 Not Estab. %    Basophil Rel % 1 Not Estab. %    Neutrophils Absolute 2.7 1.4 - 7.0 x10E3/uL    Lymphocytes Absolute 2.2 0.7 - 3.1 x10E3/uL    Monocytes Absolute 0.6 0.1 - 0.9 x10E3/uL    Eosinophils Absolute 0.3 0.0 - 0.4 x10E3/uL    Basophils Absolute 0.1 0.0 - 0.2 x10E3/uL    Immature Granulocyte Rel % 0 Not Estab. %    Immature Grans Absolute 0.0 0.0 - 0.1 x10E3/uL   Comprehensive metabolic panel    Collection Time: 02/14/22  9:50 AM    Specimen: Blood   Result Value Ref Range    Glucose 92 65 - 99 mg/dL    BUN 20 6 - 24 mg/dL    Creatinine 0.98 0.76 - 1.27 mg/dL    eGFR Non African Am 94 >59 mL/min/1.73    eGFR African Am 109 >59 mL/min/1.73    BUN/Creatinine Ratio 20 9 - 20    Sodium 141 134 - 144 mmol/L    Potassium 5.3 (H) 3.5 - 5.2 mmol/L    Chloride 103 96 - 106 mmol/L    Total CO2 25 20 - 29 mmol/L    Calcium 9.7 8.7 - 10.2 mg/dL    Total Protein 7.4 6.0 - 8.5 g/dL    Albumin 4.5 4.0 - 5.0 g/dL    Globulin 2.9 1.5 - 4.5 g/dL    A/G Ratio 1.6 1.2 - 2.2    Total Bilirubin 0.3 0.0 - 1.2 mg/dL    Alkaline Phosphatase 75 44 - 121 IU/L    AST (SGOT) 17 0 - 40 IU/L    ALT (SGPT) 14 0 - 44 IU/L   Lipid Panel With / Chol / HDL Ratio    Collection Time: 02/14/22  9:50 AM    Specimen: Blood   Result Value Ref Range    Total Cholesterol 241 (H) 100 - 199 mg/dL    Triglycerides 124 0 - 149 mg/dL    HDL Cholesterol 38 (L) >39 mg/dL    VLDL Cholesterol [place]arg[place]se 23 5 - 40 mg/dL    LDL Chol Calc (NIH) 180 (H) 0 - 99 mg/dL    Chol/HDL Ratio 6.3 (H) 0.0 - 5.0 ratio   TSH    Collection Time: 02/14/22  9:50 AM    Specimen: Blood   Result Value Ref Range    TSH 1.930 0.450 - 4.500 uIU/mL   Vitamin D 25 hydroxy    Collection Time: 02/14/22  9:50 AM    Specimen: Blood   Result Value Ref Range    25 Hydroxy,  Vitamin D 47.1 30.0 - 100.0 ng/mL     Assessment and Plan      Diagnoses and all orders for this visit:    1. Mixed hyperlipidemia (Primary)  Assessment & Plan:  Lipid abnormalities are worsening.  Nutritional counseling was provided. and Pharmacotherapy as ordered.  Lipids will be reassessed in 6 months.    Orders:  -     atorvastatin (LIPITOR) 20 MG tablet; Take 1 tablet by mouth Daily.  Dispense: 90 tablet; Refill: 2           Follow Up   No follow-ups on file.  Patient was given instructions and counseling regarding his condition or for health maintenance advice. Please see specific information pulled into the AVS if appropriate.

## 2022-02-21 ENCOUNTER — OFFICE VISIT (OUTPATIENT)
Dept: FAMILY MEDICINE CLINIC | Facility: CLINIC | Age: 42
End: 2022-02-21

## 2022-02-21 VITALS
TEMPERATURE: 98.2 F | HEIGHT: 72 IN | DIASTOLIC BLOOD PRESSURE: 60 MMHG | OXYGEN SATURATION: 98 % | BODY MASS INDEX: 29.17 KG/M2 | RESPIRATION RATE: 16 BRPM | SYSTOLIC BLOOD PRESSURE: 112 MMHG | HEART RATE: 73 BPM | WEIGHT: 215.4 LBS

## 2022-02-21 DIAGNOSIS — E78.2 MIXED HYPERLIPIDEMIA: Primary | Chronic | ICD-10-CM

## 2022-02-21 PROCEDURE — 99213 OFFICE O/P EST LOW 20 MIN: CPT | Performed by: FAMILY MEDICINE

## 2022-02-21 RX ORDER — ATORVASTATIN CALCIUM 20 MG/1
20 TABLET, FILM COATED ORAL DAILY
Qty: 90 TABLET | Refills: 2 | Status: SHIPPED | OUTPATIENT
Start: 2022-02-21 | End: 2022-09-30 | Stop reason: SDUPTHER

## 2022-02-24 ENCOUNTER — TRANSCRIBE ORDERS (OUTPATIENT)
Dept: ADMINISTRATIVE | Facility: HOSPITAL | Age: 42
End: 2022-02-24

## 2022-02-24 DIAGNOSIS — H46.9 OPTIC NEURITIS: Primary | ICD-10-CM

## 2022-03-22 ENCOUNTER — PRE-ADMISSION TESTING (OUTPATIENT)
Dept: PREADMISSION TESTING | Facility: HOSPITAL | Age: 42
End: 2022-03-22
Payer: COMMERCIAL

## 2022-03-22 VITALS
TEMPERATURE: 97 F | BODY MASS INDEX: 29.26 KG/M2 | SYSTOLIC BLOOD PRESSURE: 133 MMHG | RESPIRATION RATE: 16 BRPM | HEIGHT: 72 IN | OXYGEN SATURATION: 98 % | HEART RATE: 76 BPM | WEIGHT: 216 LBS | DIASTOLIC BLOOD PRESSURE: 73 MMHG

## 2022-03-22 LAB
ANION GAP SERPL CALCULATED.3IONS-SCNC: 10 MMOL/L (ref 5–15)
BUN SERPL-MCNC: 19 MG/DL (ref 6–20)
BUN/CREAT SERPL: 17.4 (ref 7–25)
CALCIUM SPEC-SCNC: 9.1 MG/DL (ref 8.6–10.5)
CHLORIDE SERPL-SCNC: 104 MMOL/L (ref 98–107)
CO2 SERPL-SCNC: 27 MMOL/L (ref 22–29)
CREAT SERPL-MCNC: 1.09 MG/DL (ref 0.76–1.27)
DEPRECATED RDW RBC AUTO: 42.3 FL (ref 37–54)
EGFRCR SERPLBLD CKD-EPI 2021: 86.4 ML/MIN/1.73
ERYTHROCYTE [DISTWIDTH] IN BLOOD BY AUTOMATED COUNT: 12.8 % (ref 12.3–15.4)
GLUCOSE SERPL-MCNC: 88 MG/DL (ref 65–99)
HCT VFR BLD AUTO: 44 % (ref 37.5–51)
HGB BLD-MCNC: 14.6 G/DL (ref 13–17.7)
MCH RBC QN AUTO: 30.2 PG (ref 26.6–33)
MCHC RBC AUTO-ENTMCNC: 33.2 G/DL (ref 31.5–35.7)
MCV RBC AUTO: 90.9 FL (ref 79–97)
PLATELET # BLD AUTO: 311 10*3/MM3 (ref 140–450)
PMV BLD AUTO: 10.3 FL (ref 6–12)
POTASSIUM SERPL-SCNC: 4.2 MMOL/L (ref 3.5–5.2)
RBC # BLD AUTO: 4.84 10*6/MM3 (ref 4.14–5.8)
SARS-COV-2 ORF1AB RESP QL NAA+PROBE: NOT DETECTED
SODIUM SERPL-SCNC: 141 MMOL/L (ref 136–145)
WBC NRBC COR # BLD: 7 10*3/MM3 (ref 3.4–10.8)

## 2022-03-22 PROCEDURE — C9803 HOPD COVID-19 SPEC COLLECT: HCPCS

## 2022-03-22 PROCEDURE — 80048 BASIC METABOLIC PNL TOTAL CA: CPT

## 2022-03-22 PROCEDURE — 36415 COLL VENOUS BLD VENIPUNCTURE: CPT

## 2022-03-22 PROCEDURE — U0004 COV-19 TEST NON-CDC HGH THRU: HCPCS

## 2022-03-22 PROCEDURE — 85027 COMPLETE CBC AUTOMATED: CPT

## 2022-03-22 NOTE — DISCHARGE INSTRUCTIONS
Take the following medications the morning of surgery:      NONE    ARRIVE AT 9:15    If you are on prescription narcotic pain medication to control your pain you may also take that medication the morning of surgery.    General Instructions:  Do not eat solid food after midnight the night before surgery.  You may drink clear liquids day of surgery but must stop at least one hour before your hospital arrival time.  It is beneficial for you to have a clear drink that contains carbohydrates the day of surgery.  We suggest a 12 to 20 ounce bottle of Gatorade or Powerade for non-diabetic patients or a 12 to 20 ounce bottle of G2 or Powerade Zero for diabetic patients. (Pediatric patients, are not advised to drink a 12 to 20 ounce carbohydrate drink)    Clear liquids are liquids you can see through.  Nothing red in color.     Plain water                               Sports drinks  Sodas                                   Gelatin (Jell-O)  Fruit juices without pulp such as white grape juice and apple juice  Popsicles that contain no fruit or yogurt  Tea or coffee (no cream or milk added)  Gatorade / Powerade  G2 / Powerade Zero      Patients who avoid smoking, chewing tobacco and alcohol for 4 weeks prior to surgery have a reduced risk of post-operative complications.  Quit smoking as many days before surgery as you can.  Do not smoke, use chewing tobacco or drink alcohol the day of surgery.   If applicable bring your C-PAP/ BI-PAP machine.  Bring any papers given to you in the doctor’s office.  Wear clean comfortable clothes.  Do not wear contact lenses, false eyelashes or make-up.  Bring a case for your glasses.   Bring crutches or walker if applicable.  Remove all piercings.  Leave jewelry and any other valuables at home.  Hair extensions with metal clips must be removed prior to surgery.  The Pre-Admission Testing nurse will instruct you to bring medications if unable to obtain an accurate list in Pre-Admission  Testing.          Preventing a Surgical Site Infection:  For 2 to 3 days before surgery, avoid shaving with a razor because the razor can irritate skin and make it easier to develop an infection.    Any areas of open skin can increase the risk of a post-operative wound infection by allowing bacteria to enter and travel throughout the body.  Notify your surgeon if you have any skin wounds / rashes even if it is not near the expected surgical site.  The area will need assessed to determine if surgery should be delayed until it is healed.  The night prior to surgery shower using a fresh bar of anti-bacterial soap (such as Dial) and clean washcloth.  Sleep in a clean bed with clean clothing.  Do not allow pets to sleep with you.  Shower on the morning of surgery using a fresh bar of anti-bacterial soap (such as Dial) and clean washcloth.  Dry with a clean towel and dress in clean clothing.  Ask your surgeon if you will be receiving antibiotics prior to surgery.  Make sure you, your family, and all healthcare providers clean their hands with soap and water or an alcohol based hand  before caring for you or your wound.    Day of surgery:  Your arrival time is approximately two hours before your scheduled surgery time.  Upon arrival, a Pre-op nurse and Anesthesiologist will review your health history, obtain vital signs, and answer questions you may have.  The only belongings needed at this time will be a list of your home medications and if applicable your C-PAP/BI-PAP machine.  A Pre-op nurse will start an IV and you may receive medication in preparation for surgery, including something to help you relax.     Please be aware that surgery does come with discomfort.  We want to make every effort to control your discomfort so please discuss any uncontrolled symptoms with your nurse.   Your doctor will most likely have prescribed pain medications.      If you are going home after surgery you will receive  individualized written care instructions before being discharged.  A responsible adult must drive you to and from the hospital on the day of your surgery and stay with you for 24 hours.  Discharge prescriptions can be filled by the hospital pharmacy during regular pharmacy hours.  If you are having surgery late in the day/evening your prescription may be e-prescribed to your pharmacy.  Please verify your pharmacy hours or chose a 24 hour pharmacy to avoid not having access to your prescription because your pharmacy has closed for the day.    If you are staying overnight following surgery, you will be transported to your hospital room following the recovery period.  [facility] has all private rooms.    If you have any questions please call Pre-Admission Testing at [phone].  Deductibles and co-payments are collected on the day of service. Please be prepared to pay the required co-pay, deductible or deposit on the day of service as defined by your plan.    Patient Education for Self-Quarantine Process    Following your COVID testing, we strongly recommend that you wear a mask when you are with other people and practice social distancing.   Limit your activities to only required outings.  Wash your hands with soap and water frequently for at least 20 seconds.   Avoid touching your eyes, nose and mouth with unwashed hands.  Do not share anything - utensils, drinking glasses, food from the same bowl.   Sanitize household surfaces daily. Include all high touch areas (door handles, light switches, phones, countertops, etc.)    Call your surgeon immediately if you experience any of the following symptoms:  Sore Throat  Shortness of Breath or difficulty breathing  Cough  Chills  Body soreness or muscle pain  Headache  Fever  New loss of taste or smell  Do not arrive for your surgery ill.  Your procedure will need to be rescheduled to another time.  You will need to call your physician before the day of  surgery to avoid any unnecessary exposure to hospital staff as well as other patients.

## 2022-03-23 ENCOUNTER — ANESTHESIA EVENT (OUTPATIENT)
Dept: PERIOP | Facility: HOSPITAL | Age: 42
End: 2022-03-23
Payer: COMMERCIAL

## 2022-03-24 ENCOUNTER — ANESTHESIA (OUTPATIENT)
Dept: PERIOP | Facility: HOSPITAL | Age: 42
End: 2022-03-24
Payer: COMMERCIAL

## 2022-03-24 ENCOUNTER — HOSPITAL ENCOUNTER (OUTPATIENT)
Facility: HOSPITAL | Age: 42
Setting detail: HOSPITAL OUTPATIENT SURGERY
Discharge: HOME | End: 2022-03-24
Attending: OPHTHALMOLOGY | Admitting: OPHTHALMOLOGY
Payer: COMMERCIAL

## 2022-03-24 VITALS
SYSTOLIC BLOOD PRESSURE: 117 MMHG | OXYGEN SATURATION: 92 % | HEART RATE: 79 BPM | DIASTOLIC BLOOD PRESSURE: 70 MMHG | TEMPERATURE: 98.8 F | RESPIRATION RATE: 16 BRPM

## 2022-03-24 DIAGNOSIS — H57.9 LESION OF EYE: ICD-10-CM

## 2022-03-24 PROCEDURE — C1713 ANCHOR/SCREW BN/BN,TIS/BN: HCPCS | Performed by: OPHTHALMOLOGY

## 2022-03-24 PROCEDURE — 88331 PATH CONSLTJ SURG 1 BLK 1SPC: CPT | Performed by: OPHTHALMOLOGY

## 2022-03-24 PROCEDURE — 25010000002 DROPERIDOL PER 5 MG: Performed by: ANESTHESIOLOGY

## 2022-03-24 PROCEDURE — 88334 PATH CONSLTJ SURG CYTO XM EA: CPT | Performed by: OPHTHALMOLOGY

## 2022-03-24 PROCEDURE — 25010000002 DEXAMETHASONE PER 1 MG: Performed by: NURSE ANESTHETIST, CERTIFIED REGISTERED

## 2022-03-24 PROCEDURE — 25010000002 FENTANYL CITRATE (PF) 50 MCG/ML SOLUTION: Performed by: ANESTHESIOLOGY

## 2022-03-24 PROCEDURE — 25010000002 HYDROMORPHONE PER 4 MG: Performed by: ANESTHESIOLOGY

## 2022-03-24 PROCEDURE — 25010000002 ONDANSETRON PER 1 MG: Performed by: NURSE ANESTHETIST, CERTIFIED REGISTERED

## 2022-03-24 PROCEDURE — 25010000002 NEOSTIGMINE 5 MG/10ML SOLUTION: Performed by: NURSE ANESTHETIST, CERTIFIED REGISTERED

## 2022-03-24 PROCEDURE — 25010000002 FENTANYL CITRATE (PF) 50 MCG/ML SOLUTION: Performed by: NURSE ANESTHETIST, CERTIFIED REGISTERED

## 2022-03-24 PROCEDURE — C1889 IMPLANT/INSERT DEVICE, NOC: HCPCS | Performed by: OPHTHALMOLOGY

## 2022-03-24 PROCEDURE — 25010000002 PROPOFOL 10 MG/ML EMULSION: Performed by: NURSE ANESTHETIST, CERTIFIED REGISTERED

## 2022-03-24 PROCEDURE — 25010000002 HYDROMORPHONE PER 4 MG: Performed by: NURSE ANESTHETIST, CERTIFIED REGISTERED

## 2022-03-24 PROCEDURE — 88305 TISSUE EXAM BY PATHOLOGIST: CPT | Performed by: OPHTHALMOLOGY

## 2022-03-24 PROCEDURE — 25010000002 ONDANSETRON PER 1 MG: Performed by: ANESTHESIOLOGY

## 2022-03-24 DEVICE — FLOSEAL HEMOSTATIC MATRIX, 5ML
Type: IMPLANTABLE DEVICE | Site: EYE | Status: FUNCTIONAL
Brand: FLOSEAL HEMOSTATIC MATRIX

## 2022-03-24 DEVICE — PLT MIC LEVELONE CRV W/1.5MM/SCRW 10HL 38MM: Type: IMPLANTABLE DEVICE | Site: EYE | Status: FUNCTIONAL

## 2022-03-24 DEVICE — SCRW MAXDRIVE MICRO ER 1.8X5MM: Type: IMPLANTABLE DEVICE | Site: EYE | Status: FUNCTIONAL

## 2022-03-24 DEVICE — SCRW MAXDRIVE DRL FREE STD TI 1.5X5MM: Type: IMPLANTABLE DEVICE | Site: EYE | Status: FUNCTIONAL

## 2022-03-24 RX ORDER — DEXAMETHASONE SODIUM PHOSPHATE 10 MG/ML
INJECTION INTRAMUSCULAR; INTRAVENOUS AS NEEDED
Status: DISCONTINUED | OUTPATIENT
Start: 2022-03-24 | End: 2022-03-24 | Stop reason: SURG

## 2022-03-24 RX ORDER — ERYTHROMYCIN 5 MG/G
OINTMENT OPHTHALMIC
Qty: 3.5 G | Refills: 1 | Status: SHIPPED | OUTPATIENT
Start: 2022-03-24

## 2022-03-24 RX ORDER — ONDANSETRON 4 MG/1
4 TABLET, ORALLY DISINTEGRATING ORAL EVERY 8 HOURS PRN
Qty: 15 TABLET | Refills: 0 | Status: SHIPPED | OUTPATIENT
Start: 2022-03-24 | End: 2022-03-29

## 2022-03-24 RX ORDER — NEOSTIGMINE METHYLSULFATE 0.5 MG/ML
INJECTION, SOLUTION INTRAVENOUS AS NEEDED
Status: DISCONTINUED | OUTPATIENT
Start: 2022-03-24 | End: 2022-03-24 | Stop reason: SURG

## 2022-03-24 RX ORDER — LIDOCAINE HYDROCHLORIDE 10 MG/ML
0.5 INJECTION, SOLUTION EPIDURAL; INFILTRATION; INTRACAUDAL; PERINEURAL ONCE AS NEEDED
Status: DISCONTINUED | OUTPATIENT
Start: 2022-03-24 | End: 2022-03-24 | Stop reason: HOSPADM

## 2022-03-24 RX ORDER — PROPOFOL 10 MG/ML
VIAL (ML) INTRAVENOUS AS NEEDED
Status: DISCONTINUED | OUTPATIENT
Start: 2022-03-24 | End: 2022-03-24 | Stop reason: SURG

## 2022-03-24 RX ORDER — SODIUM CHLORIDE, SODIUM LACTATE, POTASSIUM CHLORIDE, CALCIUM CHLORIDE 600; 310; 30; 20 MG/100ML; MG/100ML; MG/100ML; MG/100ML
9 INJECTION, SOLUTION INTRAVENOUS CONTINUOUS PRN
Status: DISCONTINUED | OUTPATIENT
Start: 2022-03-24 | End: 2022-03-24 | Stop reason: HOSPADM

## 2022-03-24 RX ORDER — MIDAZOLAM HYDROCHLORIDE 1 MG/ML
1 INJECTION INTRAMUSCULAR; INTRAVENOUS
Status: DISCONTINUED | OUTPATIENT
Start: 2022-03-24 | End: 2022-03-24 | Stop reason: HOSPADM

## 2022-03-24 RX ORDER — SODIUM CHLORIDE 0.9 % (FLUSH) 0.9 %
10 SYRINGE (ML) INJECTION EVERY 12 HOURS SCHEDULED
Status: DISCONTINUED | OUTPATIENT
Start: 2022-03-24 | End: 2022-03-24 | Stop reason: HOSPADM

## 2022-03-24 RX ORDER — ONDANSETRON 2 MG/ML
4 INJECTION INTRAMUSCULAR; INTRAVENOUS ONCE AS NEEDED
Status: COMPLETED | OUTPATIENT
Start: 2022-03-24 | End: 2022-03-24

## 2022-03-24 RX ORDER — KETAMINE HYDROCHLORIDE 10 MG/ML
INJECTION INTRAMUSCULAR; INTRAVENOUS AS NEEDED
Status: DISCONTINUED | OUTPATIENT
Start: 2022-03-24 | End: 2022-03-24 | Stop reason: SURG

## 2022-03-24 RX ORDER — EPHEDRINE SULFATE 50 MG/ML
5 INJECTION, SOLUTION INTRAVENOUS ONCE AS NEEDED
Status: DISCONTINUED | OUTPATIENT
Start: 2022-03-24 | End: 2022-03-24 | Stop reason: HOSPADM

## 2022-03-24 RX ORDER — ONDANSETRON 2 MG/ML
INJECTION INTRAMUSCULAR; INTRAVENOUS AS NEEDED
Status: DISCONTINUED | OUTPATIENT
Start: 2022-03-24 | End: 2022-03-24 | Stop reason: SURG

## 2022-03-24 RX ORDER — GLYCOPYRROLATE 0.2 MG/ML
INJECTION INTRAMUSCULAR; INTRAVENOUS AS NEEDED
Status: DISCONTINUED | OUTPATIENT
Start: 2022-03-24 | End: 2022-03-24 | Stop reason: SURG

## 2022-03-24 RX ORDER — HYDROCODONE BITARTRATE AND ACETAMINOPHEN 5; 325 MG/1; MG/1
1 TABLET ORAL ONCE AS NEEDED
Status: DISCONTINUED | OUTPATIENT
Start: 2022-03-24 | End: 2022-03-24 | Stop reason: HOSPADM

## 2022-03-24 RX ORDER — LIDOCAINE HYDROCHLORIDE 20 MG/ML
INJECTION, SOLUTION INFILTRATION; PERINEURAL AS NEEDED
Status: DISCONTINUED | OUTPATIENT
Start: 2022-03-24 | End: 2022-03-24 | Stop reason: SURG

## 2022-03-24 RX ORDER — HYDROCODONE BITARTRATE AND ACETAMINOPHEN 7.5; 325 MG/1; MG/1
1 TABLET ORAL EVERY 6 HOURS PRN
Qty: 15 TABLET | Refills: 0 | Status: SHIPPED | OUTPATIENT
Start: 2022-03-24

## 2022-03-24 RX ORDER — SODIUM CHLORIDE 0.9 % (FLUSH) 0.9 %
10 SYRINGE (ML) INJECTION AS NEEDED
Status: DISCONTINUED | OUTPATIENT
Start: 2022-03-24 | End: 2022-03-24 | Stop reason: HOSPADM

## 2022-03-24 RX ORDER — FENTANYL CITRATE 50 UG/ML
50 INJECTION, SOLUTION INTRAMUSCULAR; INTRAVENOUS
Status: DISCONTINUED | OUTPATIENT
Start: 2022-03-24 | End: 2022-03-24 | Stop reason: HOSPADM

## 2022-03-24 RX ORDER — BALANCED SALT SOLUTION 6.4; .75; .48; .3; 3.9; 1.7 MG/ML; MG/ML; MG/ML; MG/ML; MG/ML; MG/ML
SOLUTION OPHTHALMIC AS NEEDED
Status: DISCONTINUED | OUTPATIENT
Start: 2022-03-24 | End: 2022-03-24 | Stop reason: HOSPADM

## 2022-03-24 RX ORDER — ERYTHROMYCIN 5 MG/G
OINTMENT OPHTHALMIC AS NEEDED
Status: DISCONTINUED | OUTPATIENT
Start: 2022-03-24 | End: 2022-03-24 | Stop reason: HOSPADM

## 2022-03-24 RX ORDER — HYDROMORPHONE HCL 110MG/55ML
PATIENT CONTROLLED ANALGESIA SYRINGE INTRAVENOUS AS NEEDED
Status: DISCONTINUED | OUTPATIENT
Start: 2022-03-24 | End: 2022-03-24 | Stop reason: SURG

## 2022-03-24 RX ORDER — FENTANYL CITRATE 50 UG/ML
INJECTION, SOLUTION INTRAMUSCULAR; INTRAVENOUS AS NEEDED
Status: DISCONTINUED | OUTPATIENT
Start: 2022-03-24 | End: 2022-03-24 | Stop reason: SURG

## 2022-03-24 RX ORDER — DROPERIDOL 2.5 MG/ML
0.62 INJECTION, SOLUTION INTRAMUSCULAR; INTRAVENOUS ONCE
Status: COMPLETED | OUTPATIENT
Start: 2022-03-24 | End: 2022-03-24

## 2022-03-24 RX ORDER — HYDROMORPHONE HYDROCHLORIDE 1 MG/ML
0.5 INJECTION, SOLUTION INTRAMUSCULAR; INTRAVENOUS; SUBCUTANEOUS
Status: DISCONTINUED | OUTPATIENT
Start: 2022-03-24 | End: 2022-03-24 | Stop reason: HOSPADM

## 2022-03-24 RX ORDER — ROCURONIUM BROMIDE 10 MG/ML
INJECTION, SOLUTION INTRAVENOUS AS NEEDED
Status: DISCONTINUED | OUTPATIENT
Start: 2022-03-24 | End: 2022-03-24 | Stop reason: SURG

## 2022-03-24 RX ORDER — FLUMAZENIL 0.1 MG/ML
0.2 INJECTION INTRAVENOUS AS NEEDED
Status: DISCONTINUED | OUTPATIENT
Start: 2022-03-24 | End: 2022-03-24 | Stop reason: HOSPADM

## 2022-03-24 RX ORDER — HYDROCODONE BITARTRATE AND ACETAMINOPHEN 7.5; 325 MG/1; MG/1
1 TABLET ORAL EVERY 4 HOURS PRN
Status: DISCONTINUED | OUTPATIENT
Start: 2022-03-24 | End: 2022-03-24 | Stop reason: HOSPADM

## 2022-03-24 RX ADMIN — LIDOCAINE HYDROCHLORIDE 60 MG: 20 INJECTION, SOLUTION INFILTRATION; PERINEURAL at 11:47

## 2022-03-24 RX ADMIN — GLYCOPYRROLATE 0.4 MG: 0.2 INJECTION INTRAMUSCULAR; INTRAVENOUS at 14:27

## 2022-03-24 RX ADMIN — HYDROMORPHONE HYDROCHLORIDE 0.25 MG: 2 INJECTION, SOLUTION INTRAMUSCULAR; INTRAVENOUS; SUBCUTANEOUS at 14:21

## 2022-03-24 RX ADMIN — DEXAMETHASONE SODIUM PHOSPHATE 8 MG: 10 INJECTION INTRAMUSCULAR; INTRAVENOUS at 11:59

## 2022-03-24 RX ADMIN — ROCURONIUM BROMIDE 50 MG: 50 INJECTION INTRAVENOUS at 11:47

## 2022-03-24 RX ADMIN — HYDROCODONE BITARTRATE AND ACETAMINOPHEN 1 TABLET: 7.5; 325 TABLET ORAL at 15:05

## 2022-03-24 RX ADMIN — FENTANYL CITRATE 50 MCG: 50 INJECTION, SOLUTION INTRAMUSCULAR; INTRAVENOUS at 15:02

## 2022-03-24 RX ADMIN — ONDANSETRON 4 MG: 2 INJECTION INTRAMUSCULAR; INTRAVENOUS at 17:14

## 2022-03-24 RX ADMIN — DROPERIDOL 0.62 MG: 2.5 INJECTION, SOLUTION INTRAMUSCULAR; INTRAVENOUS at 15:30

## 2022-03-24 RX ADMIN — SODIUM CHLORIDE, POTASSIUM CHLORIDE, SODIUM LACTATE AND CALCIUM CHLORIDE: 600; 310; 30; 20 INJECTION, SOLUTION INTRAVENOUS at 13:04

## 2022-03-24 RX ADMIN — HYDROMORPHONE HYDROCHLORIDE 0.25 MG: 2 INJECTION, SOLUTION INTRAMUSCULAR; INTRAVENOUS; SUBCUTANEOUS at 14:07

## 2022-03-24 RX ADMIN — SODIUM CHLORIDE, POTASSIUM CHLORIDE, SODIUM LACTATE AND CALCIUM CHLORIDE 9 ML/HR: 600; 310; 30; 20 INJECTION, SOLUTION INTRAVENOUS at 15:33

## 2022-03-24 RX ADMIN — SODIUM CHLORIDE, POTASSIUM CHLORIDE, SODIUM LACTATE AND CALCIUM CHLORIDE 9 ML/HR: 600; 310; 30; 20 INJECTION, SOLUTION INTRAVENOUS at 09:52

## 2022-03-24 RX ADMIN — KETAMINE HYDROCHLORIDE 10 MG: 10 INJECTION INTRAMUSCULAR; INTRAVENOUS at 12:55

## 2022-03-24 RX ADMIN — KETAMINE HYDROCHLORIDE 25 MG: 10 INJECTION INTRAMUSCULAR; INTRAVENOUS at 11:55

## 2022-03-24 RX ADMIN — ONDANSETRON 4 MG: 2 INJECTION INTRAMUSCULAR; INTRAVENOUS at 13:45

## 2022-03-24 RX ADMIN — NEOSTIGMINE METHYLSULFATE 3 MG: 0.5 INJECTION INTRAVENOUS at 14:27

## 2022-03-24 RX ADMIN — FENTANYL CITRATE 100 MCG: 50 INJECTION INTRAMUSCULAR; INTRAVENOUS at 11:47

## 2022-03-24 RX ADMIN — HYDROMORPHONE HYDROCHLORIDE 0.5 MG: 1 INJECTION, SOLUTION INTRAMUSCULAR; INTRAVENOUS; SUBCUTANEOUS at 15:04

## 2022-03-24 RX ADMIN — PROPOFOL 220 MG: 10 INJECTION, EMULSION INTRAVENOUS at 11:47

## 2022-03-24 NOTE — PERIOPERATIVE NURSING NOTE
Vision to right operative eye intact,  Patient unable to open eye on his own, but with assistance able to see number of fingers held up.  Pain is tolerable, nausea is better.

## 2022-03-24 NOTE — H&P
History & Physical       Patient: Abner Champion    Date of Admission: No admission date for patient encounter.    YOB: 1980    Medical Record Number: [id number]      Chief Complaints: Right proptosis, double vision      History of Present Illness: 41 y.o. male presents with as above. No new meds/health problems since office visit      Allergies: No Known Allergies    10 point review of systems negative, except pertaining to the HPI    Medications:   Home Medications:  No current facility-administered medications on file prior to encounter.     Current Outpatient Medications on File Prior to Encounter   Medication Sig   • atorvastatin (LIPITOR) 20 MG tablet Take 1 tablet by mouth Daily. (Patient taking differently: Take 20 mg by mouth Every Night.)   • B-Complex tablet Take 1 tablet by mouth Daily. PT HOLDING FOR SURGERY   • cetirizine (zyrTEC) 10 MG tablet Take 1 tablet by mouth Daily for 30 days.   • cholecalciferol (VITAMIN D3) 25 MCG (1000 UT) tablet Take 1,000 Units by mouth Daily. PT HOLDING FOR SURGERY   • Zinc Sulfate (ZINC 15 PO) Take 1 tablet by mouth As Needed. PT HOLDING FOR SURGERY     Current Medications:  Scheduled Meds:  Continuous Infusions:No current facility-administered medications for this encounter.    PRN Meds:.    Past Medical History:   Diagnosis Date   • Low back pain    • Suspected COVID-19 virus infection 11/21/2021   • Vision changes     RIGHT EYE        Past Surgical History:   Procedure Laterality Date   • HERNIA REPAIR      as a baby   • KNEE SURGERY Left     KNEE ARTHROSCOPY        Social History     Occupational History   • Not on file   Tobacco Use   • Smoking status: Not on file   • Smokeless tobacco: Former User     Types: Chew   Vaping Use   • Vaping Use: Never used   Substance and Sexual Activity   • Alcohol use: Yes     Comment: rare   • Drug use: Never   • Sexual activity: Defer      Social History     Social History Narrative   • Not on file        Family  History   Problem Relation Age of Onset   • Diabetes Paternal Uncle    • Other Paternal Grandfather         memory issues    • Heart disease Paternal Grandfather    • Cancer Paternal Grandfather            Physical Exam   Constitutional: Alert, cooperative, in no acute distress    Head: Normocephalic.   Eyes:    Restriction of extraocular movements on the right  Neck: Normal range of motion.   Cardiovascular: Normal rate.    Pulmonary/Chest: Effort normal.   Neurological: Alert.   Skin: Skin is warm.   Psychiatric: Normal mood and affect.       Assessment/Plan:  The patient voiced understanding of the risks, benefits, and alternative forms of treatment that were discussed and the patient consents to proceed with right lateral orbitotomy with bone window for mass excision with frozen section.    Corinne Torres MD

## 2022-03-24 NOTE — ANESTHESIA PREPROCEDURE EVALUATION
Anesthesia Evaluation     Patient summary reviewed and Nursing notes reviewed   NPO Solid Status: > 8 hours             Airway   Mallampati: II  TM distance: >3 FB  Neck ROM: full  no difficulty expected  Dental - normal exam     Pulmonary - negative pulmonary ROS and normal exam   Cardiovascular - negative cardio ROS and normal exam        Neuro/Psych- negative ROS  GI/Hepatic/Renal/Endo - negative ROS     Musculoskeletal (-) negative ROS    Abdominal  - normal exam   Substance History - negative use     OB/GYN negative ob/gyn ROS         Other                        Anesthesia Plan    ASA 2     general   (There were no vitals filed for this visit.)  intravenous induction     Anesthetic plan, all risks, benefits, and alternatives have been provided, discussed and informed consent has been obtained with: patient.    Plan discussed with CRNA.        CODE STATUS:

## 2022-03-24 NOTE — ANESTHESIA POSTPROCEDURE EVALUATION
Patient: Abner Champion    Procedure Summary     Date: 03/24/22 Room / Location:  [facility]    Anesthesia Start: 1140 Anesthesia Stop: 1452    Procedure: RIGHT  LATERAL ORBITOMY WITH BONE WINDOW FOR MASS EXCISON WITH FROZEN SECTIONS (Right Face) Diagnosis:     Surgeons: Wyatt Turner MD Provider: Oscar Swain DO    Anesthesia Type: general ASA Status: 2          Anesthesia Type: general    Vitals  Vitals Value Taken Time   /70 03/24/22 1601   Temp 37.1 °C (98.8 °F) 03/24/22 1455   Pulse 63 03/24/22 1611   Resp 16 03/24/22 1600   SpO2 91 % 03/24/22 1612   Vitals shown include unvalidated device data.        Post Anesthesia Care and Evaluation    Patient location during evaluation: bedside  Patient participation: complete - patient participated  Level of consciousness: awake  Pain management: adequate  Airway patency: patent  Anesthetic complications: No anesthetic complications    Cardiovascular status: acceptable  Respiratory status: acceptable  Hydration status: acceptable    Comments: /70   Pulse 60   Temp 37.1 °C (98.8 °F) (Temporal)   Resp 16   SpO2 92%

## 2022-03-24 NOTE — ANESTHESIA PROCEDURE NOTES
Airway  Urgency: elective    Date/Time: 3/24/2022 11:50 AM  Airway not difficult    General Information and Staff    Patient location during procedure: OR  Anesthesiologist: Oscar Swain DO  CRNA: Otilia Herzog CRNA    Indications and Patient Condition  Indications for airway management: airway protection    Preoxygenated: yes (pt pre-O2 with 100% O2)  Mask difficulty assessment: 2 - vent by mask + OA or adjuvant +/- NMBA (easy BMV )    Final Airway Details  Final airway type: endotracheal airway      Successful airway: ETT  Cuffed: yes   Successful intubation technique: direct laryngoscopy  Endotracheal tube insertion site: oral  Blade: Miguel Angel  Blade size: 4  ETT size (mm): 7.0  Cormack-Lehane Classification: grade I - full view of glottis  Placement verified by: chest auscultation and capnometry   Cuff volume (mL): 7  Measured from: lips  ETT/EBT  to lips (cm): 22  Number of attempts at approach: 1  Assessment: lips, teeth, and gum same as pre-op and atraumatic intubation    Additional Comments  ATOETx1. No change in dentition.

## 2022-03-24 NOTE — OP NOTE
Patient:   MANDI HERNANDEZ            MRN: [id number]            FIN: [id number]              Age:   40 hours     Sex:  FEMALE     :  [birth year]   Associated Diagnoses:   None   Author:   TIERA PAGAN     Basic Information   Present at bedside:  Mother, Father.      Review of Systems   Not applicable:  Patient is .      Health Status   Allergies:    Allergic Reactions (All)  NKA   Current medications:    No qualifying data available      Histories     Maternal History   Include maternal history : OB DOCUMENTATION FLOWSHEET   01/10/20 00:40 CST Birth Head Circumference 31.5 cm   01/10/20 00:15 CST Birth Length 48.2 cm    Birth Weight 2.935 kg    Size For Gestational Age (or GA) Appropriate For Gestational Age  01/10/20 00:09 CST Date/Time of Birth [birth year] 00:09    Delivery Type Birth Vaginal, Spontaneous     Presentation at Delivery Occiput Anterior (OA)    EGA at Birth 39 2/7 weeks    Umbilical Cord Description 3 Vessel Cord    Rupture Membranes To Deliv Hours Calc 22.15    Gender Female    Delivery Complications Fetus Meconium Stained     Multiple Gestation Description Camacho   01/10/20 00:05 CST Maternal Amniotic Fluid Color Thin Meconium   20 07:55 CST Maternal Risk Factors in Utero None    Feeding Preference Exclusive Breast Milk    Maternal Blood Type Transcribed O Positive    Maternal HBsAg Transcribed Negative    Maternal RPR Transcribed Non Reactive    Maternal Rubella Transcribed Immune    Maternal HIV Transcribed Negative    Maternal HIV Transcribed 2 Negative    Maternal Varicella Transcribed Positive    Maternal Group B Strep Transcribed Negative    Maternal Chlamydia Transcribed Negative    Maternal Gonorrhea Transcribed Negative    Maternal Cystic Fibrosis Transcribed Negative     [place] information     [place]   20 10:59 CST Hearing Screen Status Complete   20 06:49 CST Hep B Vaccine Administered Done   20 00:09 CST % Weight Change from  OPERATIVE NOTE    Patient Identification:  Name: Abner Champion  Age: 41 y.o.  Sex: male  :  1980  MRN: [id number]                                                   Preoperative diagnosis: right orbital mass  Postoperative diagnosis: same  Procedure: 1.  Right lateral orbitotomy with bone window for excisional biopsy with frozen section  Surgeon:  Wyatt Turner MD who was present and scrubbed throughout all critical portions of the operation  Assistants:  Corinne Torres MD  Anesthesia: General  EBL: 50 mL.  Specimens:  ID Type Source Tests Collected by Time   A : RIGHT ORBITAL LESION POSSIBLE HEMANGIOMA Tissue Eye TISSUE PATHOLOGY EXAM Wyatt Turner MD 3/24/2022 0829             Description of the procedure: The patient was taken to the operating room and placed on the table in the supine position, where anesthesia was induced. 2% lidocaine with epinephrine and 0.5% marcaine in a 1:1 fashion was injected over the surgical site, and the patient was prepped and draped in the usual manner for orbitofacial surgery.      Corneal protectors were placed in both eyes.         A 15 blade was used to create a  right lateral canthus incision. Straight iris scissors were then used to create a lateral canthotomy and inferior and superior cantholysis. Sharp dissection was carried down to the lateral orbital rim. A malleable retractor was used to protect the orbital tissues. The lateral orbital rim periosteum was incised with electrocautery and the internal periosteum was elevated with a periosteal elevator. Perforating vessels into the lateral orbital wall were taken down with monopolar cautery.     A bone window was marked with electrocautery, approximately 2 cm in length at the lateral orbital rim.  An oscillating bone saw was then used to create the window and the excised piece of bone was removed with a Leksell rongeur and kept for reattachment at the end of the case.     Attention was then turned to  Birth -[id number]   20 00:00 CST Cardiac Screening Pass, Done   01/10/20 00:09 CST Resuscitation at Birth Other: see NICU note    Date/Time of Birth [birth year] 00:09    Delivery Type Birth Vaginal, Spontaneous    EGA at Birth 39 2/7 weeks    Gender Female     Multiple Gestation Description Camacho    Delivery Complications Fetus Meconium Stained    Apgar 1 Minute by History 8    Apgar 5 Minute by History 9   .     Family History:    No family history items have been selected or recorded.     Physical Examination   VS/Measurements   Measurements from flowsheet : Height and Weight   20 14:16 CST CLINICALWEIGHT 2.915 kg   20 00:09 CST CLINICALWEIGHT 2.915 kg   01/10/20 18:30 CST Head Circumference CM 32 cm   01/10/20 01:27 CST CLINICALWEIGHT 2.935 kg    CLINICALHEIGHT 48 cm    BMI-Medical History 12.7 kg/m2    Head Circumference CM 31.5 cm   01/10/20 01:23 CST CLINICALWEIGHT 2.935 kg    Weight Method Measured    MEDDOSEWT 2.9 kg    CLINICALHEIGHT 48 cm    Height Method Measured    BSA - Medical History 0.19    BMI-Medical History 12.7 kg/m2   01/10/20 01:00 CST CLINICALWEIGHT 2.935 kg    Weight Method Measured    MEDDOSEWT 2.9 kg    CLINICALHEIGHT 48 cm    Height Method Measured    Weight Scale Bed    BSA - Medical History 0.19    BMI-Medical History 12.7 kg/m2   01/10/20 00:40 CST CLINICALWEIGHT 2.935 kg    Weight Method Measured    MEDDOSEWT 2.9 kg    CLINICALHEIGHT 48 cm    Height Method Measured    BSA - Medical History 0.19    BMI-Medical History 12.7 kg/m2   01/10/20 00:38 CST CLINICALWEIGHT 2.935 kg    Weight Method Measured    MEDDOSEWT 2.9 kg    CLINICALHEIGHT 48 cm    Height Method Measured    BSA - Medical History 0.19    BMI-Medical History 12.7 kg/m2     ,    Vitals between:   10-MELITON-2020 16:42:07   TO   2020 16:42:07                   LAST RESULT MINIMUM MAXIMUM  Temperature 36.9 36.2 37  Heart Rate 144 120 150  Respiratory Rate 44 36 44  SpO2                    100 100  the globe where a 180° peritomy was performed at medial limbus.  A Coffey muscle hook was used to hook the medial rectus muscle.  A double-armed 6-0 Vicryl suture was placed through the muscle and locked to snare it before it was released from the globe.  Ribbon retractors were used to retract the globe laterally and blunt dissection with cotton-tipped applicators was continued posteriorly to identify the mass in its medial, retrobulbar position.  It appeared to be a well-circumscribed purple lesion that was firm to palpation. Blunt dissection with cotton-tipped applicators and a hemostat was used to visualize the lesion. An Vanessa clamp was used to grasp the lesion and blunt dissection continued posteriorly where it extended near the apex. An additional hemostat was used to clamp the lesion at its posterior aspect and it was removed in its entirety with gentle traction. Electrocautery and Floseal were for hemostasis. The lesion was sent to the pathology lab for frozen and permanent section. Frozen section returned as benign hemangioma.      Next, the medial rectus muscle was sutured to its original position 5.5 mm from the limbus with care to only partially penetrate the sclera. The conjunctival peritomy was closed with 8-0 Vicryl suture in a buried fashion.  .  Next, the bone window was reattached in its original position with 1 curved titanium microplate and seven 5 mm screws, one of which was a rescue screw.     The lateral aspects of the inferior and superior lids were reattached to the deep internal periosteal tissue posterior to the lateral orbital rim with 5-0 vicryl. The skin was closed in multiple layers with 5-0 vicryl suture and with 5-0 fast absorbing suture.       The corneal protectors were removed and antibiotic ophthalmic ointment was placed over the surgical site.      The patient was then awakened and taken from the operating room in good condition, having tolerated the procedure well. There were  100    General:  No acute distress, Alert, Responsive, In open crib.    Eye:  Pupils are equal, round and reactive to light, Normal conjunctiva.         Red reflex: Bilaterally, Present, Symmetrical.    HENT:  Normocephalic, Nares patent, Anterior fontanelle open/soft/flat, Ears normally set and rotated, Palate intact.   Neck:  Supple, No lymphadenopathy, Full range of motion, Clavicles intact.   Respiratory:  Lungs are clear to auscultation, Respirations are non-labored, Breath sounds are equal, Symmetrical chest wall expansion.   Cardiovascular:  Normal rate, Regular rhythm, No murmur, Good pulses equal in all extremities, Normal peripheral perfusion, No edema.   Gastrointestinal:  Soft, Non-tender, Non-distended, Normal bowel sounds, 3 vessel umbilical cord, Anus patent.   Genitourinary:  Normal genitalia for age and sex.    Musculoskeletal     Normal range of motion.     Normal strength.     No tenderness.     No swelling.     No deformity.     No hip clicks.     Normal Russell's.     Normal Ortolani's.     Integumentary:  Warm, Dry, Pink, Intact, No pallor, No rash.    Neurologic:  Alert, Normal motor function, Moves all extremities appropriately, Jenn, rooting, sucking reflexes are normal, No focal deficits, Gag reflex normal, Hand grasp present, Toe grasp present.      Review / Management   Results review:     Labs between:  10-MELITON-2020 16:42 to 2020 16:42  CMP:                 AST  ALT  AlkPhos  Bili  Albumin   2020       4.4                    .      Health Maintenance   Medication Administered:  Medication administered Phytonadione, and erythromycin 0.5% ophthalmic ointment applied in both eyes.      Impression and Plan   Diagnosis     Labs:  Mom O+ / Baby O+ / Antonia - neg  Assessment:  Patient is a 2 day old female , born on 2022 via  at 39+2 weeks GA. + meconium at birth.  Pt was initially with tachypnea and admitted to NICU for brief perior of time with diagnosis of TTN.  no complications, and the estimated blood loss was less than 50 cc.     Required CPAP support x 4 hours, then was quickly weaned to Room Air and remained comfortable and stable with her respiratory status thereafter.  She is now doing well. Continue routine  care. Follow up will be scheduled with PCP Dr. Rip Cuellar in 2 days.  Plan:  Ok to discharge home today.   -Total/Direct Bilirubin level = 4.4/0.2 at 31 HOL = LR.  Bili level does NOT need to be repeated.   -NBS - sent  -Pulse ox screen >24h for cardiac screening - passed.   -Hearing screen - passed B/L.   -Hepatitis B vaccination - given on  2020 .   .     Condition:  Stable.    Plan     Breast feeding on demand.

## 2022-03-25 LAB
BEAKER LAB AP INTRAOPERATIVE CONSULTATION: NORMAL
LAB AP CASE REPORT: NORMAL
LAB AP CLINICAL INFORMATION: NORMAL
PATH REPORT.FINAL DX SPEC: NORMAL
PATH REPORT.GROSS SPEC: NORMAL

## 2022-09-07 ENCOUNTER — OFFICE VISIT (OUTPATIENT)
Dept: FAMILY MEDICINE CLINIC | Facility: CLINIC | Age: 42
End: 2022-09-07

## 2022-09-07 VITALS
RESPIRATION RATE: 18 BRPM | OXYGEN SATURATION: 98 % | DIASTOLIC BLOOD PRESSURE: 72 MMHG | SYSTOLIC BLOOD PRESSURE: 122 MMHG | BODY MASS INDEX: 31.35 KG/M2 | HEIGHT: 71 IN | WEIGHT: 223.9 LBS | TEMPERATURE: 96.9 F | HEART RATE: 65 BPM

## 2022-09-07 DIAGNOSIS — E78.2 MIXED HYPERLIPIDEMIA: ICD-10-CM

## 2022-09-07 DIAGNOSIS — Z00.00 PHYSICAL EXAM: Primary | ICD-10-CM

## 2022-09-07 PROCEDURE — 99213 OFFICE O/P EST LOW 20 MIN: CPT | Performed by: FAMILY MEDICINE

## 2022-09-07 PROCEDURE — 99396 PREV VISIT EST AGE 40-64: CPT | Performed by: FAMILY MEDICINE

## 2022-09-07 RX ORDER — UBIDECARENONE 50 MG
CAPSULE ORAL DAILY
COMMUNITY

## 2022-09-07 NOTE — PROGRESS NOTES
"Chief Complaint  Chief Complaint   Patient presents with   • Annual Exam       Subjective    History of Present Illness        Abner Champion presents to [facility] for   History of Present Illness   Abner Champion is a 42 y.o. male here for his annual physical with me. Abner is here for coordination of medical care, to discuss health maintenance, disease prevention as well as to followup on medical problems. Patient has been followed by me since 12/30/2019. Patient's last CPE was 8/30/2021. Activity level is moderate. Exercises 2 per week. Appetite is fair. Feels fairly well with few complaints. Energy level is good. Sleeps fairly well.      Objective   Vital Signs:   Visit Vitals  /72 (BP Location: Left arm, Patient Position: Sitting, Cuff Size: Adult)   Pulse 65   Temp 96.9 °F (36.1 °C) (Temporal)   Resp 18   Ht 181.6 cm (71.5\")   Wt 102 kg (223 lb 14.4 oz)   SpO2 98%   BMI 30.80 kg/m²       BMI is >= 25 and <30. (Overweight) The following options were offered after discussion;: weight loss educational material (shared in after visit summary) and exercise counseling/recommendations     Physical Exam  Vitals reviewed.   Constitutional:       Appearance: He is well-developed.   HENT:      Head: Normocephalic and atraumatic.      Right Ear: External ear normal.      Left Ear: External ear normal.      Nose: Nose normal.   Eyes:      General: Lids are normal.      Conjunctiva/sclera: Conjunctivae normal.      Pupils: Pupils are equal, round, and reactive to light.   Cardiovascular:      Rate and Rhythm: Normal rate and regular rhythm.      Pulses: Normal pulses.      Heart sounds: Normal heart sounds.   Pulmonary:      Effort: Pulmonary effort is normal. No respiratory distress.      Breath sounds: Normal breath sounds.   Abdominal:      General: Bowel sounds are normal.      Palpations: Abdomen is soft.   Musculoskeletal:         General: Normal range of motion.      Cervical " back: Normal range of motion and neck supple.   Skin:     General: Skin is warm and dry.      Capillary Refill: Capillary refill takes less than 2 seconds.   Neurological:      Mental Status: He is alert and oriented to person, place, and time.   Psychiatric:         Behavior: Behavior normal.         Thought Content: Thought content normal.         Judgment: Judgment normal.            Result Review :                    Assessment and Plan   {CC Problem List  Visit Diagnosis  ROS  Review (Popup)  Health Maintenance  Quality  BestPractice  Medications  SmartSets  SnapShot Encounters  Media :23}   Diagnoses and all orders for this visit:    1. Physical exam (Primary)  Assessment & Plan:  Discussed injury prevention, diet and exercise, safe sexual practices, and screening for common diseases. Encouraged use of sunscreen and seatbelts. Avoidance of tobacco encouraged. Limitation or avoidance of alcohol encouraged. Recommend yearly dental and eye exams. Also discussed monitoring of blood pressure, lipids.    Orders:  -     CBC & Differential  -     Comprehensive Metabolic Panel  -     Lipid Panel With / Chol / HDL Ratio  -     TSH    2. Mixed hyperlipidemia  Assessment & Plan:  Lipid abnormalities are unchanged.  Nutritional counseling was provided. and Pharmacotherapy as ordered.  Lipids will be reassessed in 6 months.    Orders:  -     Lipid Panel With / Chol / HDL Ratio           Follow Up   No follow-ups on file.  Patient was given instructions and counseling regarding his condition or for health maintenance advice. Please see specific information pulled into the AVS if appropriate.

## 2022-09-09 LAB
ALBUMIN SERPL-MCNC: 4.6 G/DL (ref 4–5)
ALBUMIN/GLOB SERPL: 2.1 {RATIO} (ref 1.2–2.2)
ALP SERPL-CCNC: 69 IU/L (ref 44–121)
ALT SERPL-CCNC: 26 IU/L (ref 0–44)
AST SERPL-CCNC: 24 IU/L (ref 0–40)
BASOPHILS # BLD AUTO: 0.1 X10E3/UL (ref 0–0.2)
BASOPHILS NFR BLD AUTO: 1 %
BILIRUB SERPL-MCNC: 0.2 MG/DL (ref 0–1.2)
BUN SERPL-MCNC: 17 MG/DL (ref 6–24)
BUN/CREAT SERPL: 17 (ref 9–20)
CALCIUM SERPL-MCNC: 9.5 MG/DL (ref 8.7–10.2)
CHLORIDE SERPL-SCNC: 104 MMOL/L (ref 96–106)
CHOLEST SERPL-MCNC: 154 MG/DL (ref 100–199)
CHOLEST/HDLC SERPL: 3.4 RATIO (ref 0–5)
CO2 SERPL-SCNC: 24 MMOL/L (ref 20–29)
CREAT SERPL-MCNC: 0.98 MG/DL (ref 0.76–1.27)
EGFRCR-CYS SERPLBLD CKD-EPI 2021: 98 ML/MIN/1.73
EOSINOPHIL # BLD AUTO: 0.2 X10E3/UL (ref 0–0.4)
EOSINOPHIL NFR BLD AUTO: 4 %
ERYTHROCYTE [DISTWIDTH] IN BLOOD BY AUTOMATED COUNT: 12.5 % (ref 11.6–15.4)
GLOBULIN SER CALC-MCNC: 2.2 G/DL (ref 1.5–4.5)
GLUCOSE SERPL-MCNC: 88 MG/DL (ref 65–99)
HCT VFR BLD AUTO: 41.8 % (ref 37.5–51)
HDLC SERPL-MCNC: 45 MG/DL
HGB BLD-MCNC: 14.2 G/DL (ref 13–17.7)
IMM GRANULOCYTES # BLD AUTO: 0 X10E3/UL (ref 0–0.1)
IMM GRANULOCYTES NFR BLD AUTO: 0 %
LDLC SERPL CALC-MCNC: 94 MG/DL (ref 0–99)
LYMPHOCYTES # BLD AUTO: 2.2 X10E3/UL (ref 0.7–3.1)
LYMPHOCYTES NFR BLD AUTO: 36 %
MCH RBC QN AUTO: 30.4 PG (ref 26.6–33)
MCHC RBC AUTO-ENTMCNC: 34 G/DL (ref 31.5–35.7)
MCV RBC AUTO: 90 FL (ref 79–97)
MONOCYTES # BLD AUTO: 0.5 X10E3/UL (ref 0.1–0.9)
MONOCYTES NFR BLD AUTO: 9 %
NEUTROPHILS # BLD AUTO: 3.1 X10E3/UL (ref 1.4–7)
NEUTROPHILS NFR BLD AUTO: 50 %
PLATELET # BLD AUTO: 301 X10E3/UL (ref 150–450)
POTASSIUM SERPL-SCNC: 4.6 MMOL/L (ref 3.5–5.2)
PROT SERPL-MCNC: 6.8 G/DL (ref 6–8.5)
RBC # BLD AUTO: 4.67 X10E6/UL (ref 4.14–5.8)
SODIUM SERPL-SCNC: 142 MMOL/L (ref 134–144)
TRIGL SERPL-MCNC: 77 MG/DL (ref 0–149)
TSH SERPL DL<=0.005 MIU/L-ACNC: 2.41 UIU/ML (ref 0.45–4.5)
VLDLC SERPL CALC-MCNC: 15 MG/DL (ref 5–40)
WBC # BLD AUTO: 6.1 X10E3/UL (ref 3.4–10.8)

## 2022-09-30 DIAGNOSIS — E78.2 MIXED HYPERLIPIDEMIA: Chronic | ICD-10-CM

## 2022-09-30 RX ORDER — ATORVASTATIN CALCIUM 20 MG/1
20 TABLET, FILM COATED ORAL DAILY
Qty: 90 TABLET | Refills: 2 | OUTPATIENT
Start: 2022-09-30

## 2022-10-03 RX ORDER — ATORVASTATIN CALCIUM 20 MG/1
20 TABLET, FILM COATED ORAL DAILY
Qty: 90 TABLET | Refills: 3 | Status: SHIPPED | OUTPATIENT
Start: 2022-10-03

## 2023-08-02 ENCOUNTER — TELEPHONE (OUTPATIENT)
Dept: FAMILY MEDICINE CLINIC | Facility: CLINIC | Age: 43
End: 2023-08-02

## 2023-08-02 NOTE — TELEPHONE ENCOUNTER
Caller: Abner Champion    Relationship to patient: Self    Best call back number: [phone]    Date of positive COVID19 test: 7/31/23    COVID19 symptoms: FLU LIKE SYMPTOMS, ACHY BODY, DRAINAGE, EARS PAIN     Date of initial quarantine: 7/31/23    Additional information or concerns: IS THERE ANYTHING DR HERNANDEZ CAN SEND IN TO HELP ALLEVIATE THE SYMPTOMS     What is the patients preferred pharmacy: [id number] - [place], [place] - [address] AT [facility] [address] - [phone]  - [phone] FX

## 2023-08-03 NOTE — TELEPHONE ENCOUNTER
Called and spoke to patient. States he is feeling better and thinks he can wait it out and does not want to pass anything on to anyone by coming in. Advised he could try virtual uc visit if he feels it is needed and does not want to get out.

## 2023-10-06 DIAGNOSIS — E78.2 MIXED HYPERLIPIDEMIA: Chronic | ICD-10-CM

## 2023-10-06 RX ORDER — ATORVASTATIN CALCIUM 20 MG/1
20 TABLET, FILM COATED ORAL DAILY
Qty: 90 TABLET | Refills: 3 | OUTPATIENT
Start: 2023-10-06

## 2023-10-06 NOTE — TELEPHONE ENCOUNTER
Rx Refill Note  Requested Prescriptions     Pending Prescriptions Disp Refills    atorvastatin (LIPITOR) 20 MG tablet [Pharmacy Med Name: ATORVASTATIN 20MG TABLETS] 90 tablet 3     Sig: TAKE 1 TABLET BY MOUTH DAILY      Last office visit with prescribing clinician: 9/7/2022   Last telemedicine visit with prescribing clinician: Visit date not found   Next office visit with prescribing clinician: Visit date not found                         Would you like a call back once the refill request has been completed: [] Yes [] No    If the office needs to give you a call back, can they leave a voicemail: [] Yes [] No    Delio Parnell MA  10/06/23, 11:02 EDT

## 2023-11-20 ENCOUNTER — OFFICE VISIT (OUTPATIENT)
Dept: FAMILY MEDICINE CLINIC | Facility: CLINIC | Age: 43
End: 2023-11-20
Payer: COMMERCIAL

## 2023-11-20 VITALS
OXYGEN SATURATION: 98 % | HEIGHT: 72 IN | WEIGHT: 220.9 LBS | BODY MASS INDEX: 29.92 KG/M2 | SYSTOLIC BLOOD PRESSURE: 130 MMHG | DIASTOLIC BLOOD PRESSURE: 80 MMHG | HEART RATE: 75 BPM

## 2023-11-20 DIAGNOSIS — E78.2 MIXED HYPERLIPIDEMIA: Chronic | ICD-10-CM

## 2023-11-20 DIAGNOSIS — R29.898 LEFT LEG WEAKNESS: ICD-10-CM

## 2023-11-20 DIAGNOSIS — N52.9 ERECTILE DYSFUNCTION, UNSPECIFIED ERECTILE DYSFUNCTION TYPE: ICD-10-CM

## 2023-11-20 DIAGNOSIS — Z12.5 SCREENING FOR PROSTATE CANCER: ICD-10-CM

## 2023-11-20 DIAGNOSIS — Z12.11 SCREENING FOR COLON CANCER: ICD-10-CM

## 2023-11-20 DIAGNOSIS — Z00.00 ANNUAL PHYSICAL EXAM: Primary | ICD-10-CM

## 2023-11-20 LAB
ALBUMIN SERPL-MCNC: 4.8 G/DL (ref 3.5–5.2)
ALBUMIN/GLOB SERPL: 2.3 G/DL
ALP SERPL-CCNC: 68 U/L (ref 39–117)
ALT SERPL-CCNC: 26 U/L (ref 1–41)
AST SERPL-CCNC: 26 U/L (ref 1–40)
BASOPHILS # BLD AUTO: 0.03 10*3/MM3 (ref 0–0.2)
BASOPHILS NFR BLD AUTO: 0.5 % (ref 0–1.5)
BILIRUB SERPL-MCNC: 0.5 MG/DL (ref 0–1.2)
BUN SERPL-MCNC: 16 MG/DL (ref 6–20)
BUN/CREAT SERPL: 15.8 (ref 7–25)
CALCIUM SERPL-MCNC: 9.6 MG/DL (ref 8.6–10.5)
CHLORIDE SERPL-SCNC: 104 MMOL/L (ref 98–107)
CHOLEST SERPL-MCNC: 183 MG/DL (ref 0–200)
CHOLEST/HDLC SERPL: 3.45 {RATIO}
CO2 SERPL-SCNC: 29.4 MMOL/L (ref 22–29)
CREAT SERPL-MCNC: 1.01 MG/DL (ref 0.76–1.27)
EGFRCR SERPLBLD CKD-EPI 2021: 93.5 ML/MIN/1.73
EOSINOPHIL # BLD AUTO: 0.26 10*3/MM3 (ref 0–0.4)
EOSINOPHIL NFR BLD AUTO: 4.4 % (ref 0.3–6.2)
ERYTHROCYTE [DISTWIDTH] IN BLOOD BY AUTOMATED COUNT: 13 % (ref 12.3–15.4)
GLOBULIN SER CALC-MCNC: 2.1 GM/DL
GLUCOSE SERPL-MCNC: 97 MG/DL (ref 65–99)
HCT VFR BLD AUTO: 43.6 % (ref 37.5–51)
HDLC SERPL-MCNC: 53 MG/DL (ref 40–60)
HGB BLD-MCNC: 15 G/DL (ref 13–17.7)
IMM GRANULOCYTES # BLD AUTO: 0.03 10*3/MM3 (ref 0–0.05)
IMM GRANULOCYTES NFR BLD AUTO: 0.5 % (ref 0–0.5)
LDLC SERPL CALC-MCNC: 106 MG/DL (ref 0–100)
LYMPHOCYTES # BLD AUTO: 1.54 10*3/MM3 (ref 0.7–3.1)
LYMPHOCYTES NFR BLD AUTO: 26.3 % (ref 19.6–45.3)
MCH RBC QN AUTO: 30.8 PG (ref 26.6–33)
MCHC RBC AUTO-ENTMCNC: 34.4 G/DL (ref 31.5–35.7)
MCV RBC AUTO: 89.5 FL (ref 79–97)
MONOCYTES # BLD AUTO: 0.63 10*3/MM3 (ref 0.1–0.9)
MONOCYTES NFR BLD AUTO: 10.8 % (ref 5–12)
NEUTROPHILS # BLD AUTO: 3.36 10*3/MM3 (ref 1.7–7)
NEUTROPHILS NFR BLD AUTO: 57.5 % (ref 42.7–76)
NRBC BLD AUTO-RTO: 0 /100 WBC (ref 0–0.2)
PLATELET # BLD AUTO: 332 10*3/MM3 (ref 140–450)
POTASSIUM SERPL-SCNC: 4.4 MMOL/L (ref 3.5–5.2)
PROT SERPL-MCNC: 6.9 G/DL (ref 6–8.5)
PSA SERPL-MCNC: 1.3 NG/ML (ref 0–4)
RBC # BLD AUTO: 4.87 10*6/MM3 (ref 4.14–5.8)
SODIUM SERPL-SCNC: 142 MMOL/L (ref 136–145)
TRIGL SERPL-MCNC: 137 MG/DL (ref 0–150)
TSH SERPL DL<=0.005 MIU/L-ACNC: 2.72 UIU/ML (ref 0.27–4.2)
VLDLC SERPL CALC-MCNC: 24 MG/DL (ref 5–40)
WBC # BLD AUTO: 5.85 10*3/MM3 (ref 3.4–10.8)

## 2023-11-20 RX ORDER — ATORVASTATIN CALCIUM 20 MG/1
20 TABLET, FILM COATED ORAL DAILY
Qty: 90 TABLET | Refills: 3 | Status: SHIPPED | OUTPATIENT
Start: 2023-11-20

## 2023-11-20 RX ORDER — TADALAFIL 10 MG/1
10 TABLET ORAL DAILY PRN
Qty: 90 TABLET | Refills: 3 | Status: SHIPPED | OUTPATIENT
Start: 2023-11-20

## 2023-11-20 RX ORDER — TADALAFIL 10 MG/1
10 TABLET ORAL DAILY PRN
Qty: 90 TABLET | Refills: 3 | Status: SHIPPED | OUTPATIENT
Start: 2023-11-20 | End: 2023-11-20 | Stop reason: SDUPTHER

## 2023-11-20 NOTE — PROGRESS NOTES
"Chief Complaint  Chief Complaint   Patient presents with    Annual Exam       Subjective    History of Present Illness        Abner Champion presents to [facility] for   History of Present Illness  Abner Champion is a 43 y.o. male here for his annual physical with me. Abner is here for coordination of medical care, to discuss health maintenance, disease prevention as well as to followup on medical problems. Patient has been followed by me since 2019. Patient's last CPE was 2022. Activity level is minimal. Exercises 0 per week. Appetite is good. Feels well with few complaints of his leg pain and muscle weakness.  This is causing him to have a foot drop.  He is going to be seen by a specialist. Energy level is fair. Sleeps well.        Objective   Vital Signs:   Visit Vitals  /80 (BP Location: Left arm, Patient Position: Sitting, Cuff Size: Adult)   Pulse 75   Ht 182.9 cm (72\")   Wt 100 kg (220 lb 14.4 oz)   SpO2 98%   BMI 29.96 kg/m²       BMI is >= 25 and <30. (Overweight) The following options were offered after discussion;: exercise counseling/recommendations and nutrition counseling/recommendations     Physical Exam  Vitals reviewed.   Constitutional:       Appearance: He is well-developed.   HENT:      Head: Normocephalic and atraumatic.      Nose: Nose normal.   Eyes:      General: Lids are normal.      Conjunctiva/sclera: Conjunctivae normal.      Pupils: Pupils are equal, round, and reactive to light.   Cardiovascular:      Rate and Rhythm: Normal rate and regular rhythm.      Pulses: Normal pulses.      Heart sounds: Normal heart sounds.   Pulmonary:      Effort: Pulmonary effort is normal. No respiratory distress.      Breath sounds: Normal breath sounds.   Abdominal:      General: Bowel sounds are normal.      Palpations: Abdomen is soft.   Musculoskeletal:         General: Normal range of motion.      Cervical back: Normal range of motion and neck supple.   Skin:   "   General: Skin is warm and dry.      Capillary Refill: Capillary refill takes less than 2 seconds.   Neurological:      Mental Status: He is alert and oriented to person, place, and time.   Psychiatric:         Behavior: Behavior normal.         Thought Content: Thought content normal.         Judgment: Judgment normal.            Result Review :                    Assessment and Plan      Diagnoses and all orders for this visit:    1. Annual physical exam (Primary)  Assessment & Plan:  Discussed injury prevention, diet and exercise, safe sexual practices, and screening for common diseases. Encouraged use of sunscreen and seatbelts. Avoidance of tobacco encouraged. Limitation or avoidance of alcohol encouraged. Recommend yearly dental and eye exams. Also discussed monitoring of blood pressure, lipids.    Orders:  -     CBC & Differential  -     Comprehensive Metabolic Panel  -     Lipid Panel With / Chol / HDL Ratio  -     TSH    2. Left leg weakness  Assessment & Plan:  Due to his leg weakness and foot drop of the left leg an MRI and EMG were ordered. He is going to be seen by a specialist in [place] to further review his symptoms.     Orders:  -     MRI Femur Left With Contrast; Future  -     EMG & Nerve Conduction Test; Future    3. Screening for prostate cancer  -     PSA Screen    4. Mixed hyperlipidemia  -     atorvastatin (LIPITOR) 20 MG tablet; Take 1 tablet by mouth Daily.  Dispense: 90 tablet; Refill: 3    5. Erectile dysfunction, unspecified erectile dysfunction type  -     Discontinue: tadalafil (CIALIS) 10 MG tablet; Take 1 tablet by mouth Daily As Needed for Erectile Dysfunction.  Dispense: 90 tablet; Refill: 3  -     tadalafil (CIALIS) 10 MG tablet; Take 1 tablet by mouth Daily As Needed for Erectile Dysfunction.  Dispense: 90 tablet; Refill: 3    6. Screening for colon cancer  -     Ambulatory Referral For Screening Colonoscopy             Follow Up   No follow-ups on file.  Patient was given  instructions and counseling regarding his condition or for health maintenance advice. Please see specific information pulled into the AVS if appropriate.

## 2023-11-20 NOTE — ASSESSMENT & PLAN NOTE
Due to his leg weakness and foot drop of the left leg an MRI and EMG were ordered. He is going to be seen by a specialist in [place] to further review his symptoms.

## 2023-12-20 ENCOUNTER — TELEPHONE (OUTPATIENT)
Dept: FAMILY MEDICINE CLINIC | Facility: CLINIC | Age: 43
End: 2023-12-20

## 2023-12-20 NOTE — TELEPHONE ENCOUNTER
Caller: Abner Champion    Relationship: Self    Best call back number: [phone]    What orders are you requesting (i.e. lab or imaging): MRI    In what timeframe would the patient need to come in: ASAP    Where will you receive your lab/imaging services: [facility] IN [place]    Additional notes: PATIENT STATED DR HERNANDEZ SENT MRI ORDERS TO [facility].    HE WOULD LIKE TO REQUEST ORDERS BE SENT TO [facility] IN [place] INSTEAD.    PLEASE CALL ONCE SENT.

## 2023-12-21 ENCOUNTER — PREP FOR SURGERY (OUTPATIENT)
Dept: OTHER | Facility: HOSPITAL | Age: 43
End: 2023-12-21
Payer: COMMERCIAL

## 2023-12-21 DIAGNOSIS — Z12.11 SCREEN FOR COLON CANCER: Primary | ICD-10-CM

## 2023-12-21 NOTE — TELEPHONE ENCOUNTER
Caller: Abner Champion    Relationship: Self    Best call back number: [phone]    What is the best time to reach you: ANYTIME    Who are you requesting to speak with (clinical staff, provider,  specific staff member): CLINICAL    What was the call regarding:PATIENT STATED CHANGING THE REFERRAL IS NO LONGER NEEDED, HE WILL GO WITH [facility]

## 2024-01-16 ENCOUNTER — HOSPITAL ENCOUNTER (OUTPATIENT)
Dept: MRI IMAGING | Facility: HOSPITAL | Age: 44
Discharge: HOME | End: 2024-01-16
Admitting: FAMILY MEDICINE
Payer: COMMERCIAL

## 2024-01-16 DIAGNOSIS — R29.898 LEFT LEG WEAKNESS: ICD-10-CM

## 2024-01-16 PROCEDURE — 25010000002 GADOTERIDOL PER 1 ML: Performed by: FAMILY MEDICINE

## 2024-01-16 PROCEDURE — A9579 GAD-BASE MR CONTRAST NOS,1ML: HCPCS | Performed by: FAMILY MEDICINE

## 2024-01-16 PROCEDURE — 73720 MRI LWR EXTREMITY W/O&W/DYE: CPT

## 2024-01-16 RX ADMIN — GADOTERIDOL 20 ML: 279.3 INJECTION, SOLUTION INTRAVENOUS at 07:53

## 2024-01-17 ENCOUNTER — TELEPHONE (OUTPATIENT)
Dept: FAMILY MEDICINE CLINIC | Facility: CLINIC | Age: 44
End: 2024-01-17

## 2024-01-18 NOTE — PROGRESS NOTES
EMG and Nerve Conduction Studies    Patient Name: Abner Champion    Date of Study:1-    Referring Provider:Dr. Marques    History:  LLE Left leg weakness     This patient is a 43-year-old male.  He reports several years ago he developed a left foot drop which gradually recovered.  This past November while playing golf he again developed pain numbness and weakness in the left leg.  He has no weakness of plantarflexion he has minimal to no dorsiflexion.  Recent MRI was reported to show ... Probable ganglion affecting the peroneal nerve and specifically the deep peroneal nerve....       Results:    The complete report includes the data sheets.     Prior to starting the procedure, the patient's identity was verified, pertinent available records were reviewed, the nature of the procedure was explained, the appropriate site of the exam were confirmed directly with the patient, and a pre-procedure pause was performed for final verification of all the above.    1.  The left sural sensory and left tibial motor nerve conduction studies were normal.      2.  With recording electrodes over the extensor digitorum brevis muscle there is no significant response recorded from the extensor digitorum brevis muscle.  With stimulation below the fibular head and above the fibular head 0.2 mV response was recorded.    3.  With recording electrodes over the tibialis anterior muscle and stimulation of the peroneal nerve below and above the fibular head at 1.3 mV response is recorded at both sites with a conduction velocity of 55 m/s.    4.  EMG of the extensor digitorum brevis muscle showed fibrillations positive sharp waves.  There were rare motor units with increased duration, polyphasic and a discrete motor unit firing pattern.  The tibialis anterior muscle had fibrillations and positive sharp waves with no voluntary motor units elicited.    5.  EMG of the peroneus longus muscle showed normal insertional activity no spontaneous  potentials with a normal recruitment pattern.  The gastrocnemius and vastus lateralis muscles were also normal.            Impression:    This is an abnormal study.  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      Electronically signed by :    Joseph Seipel, M.D.  January 19, 2024

## 2024-01-19 ENCOUNTER — PROCEDURE VISIT (OUTPATIENT)
Dept: NEUROLOGY | Facility: CLINIC | Age: 44
End: 2024-01-19
Payer: COMMERCIAL

## 2024-01-19 VITALS
HEART RATE: 77 BPM | BODY MASS INDEX: 29.8 KG/M2 | HEIGHT: 72 IN | DIASTOLIC BLOOD PRESSURE: 84 MMHG | SYSTOLIC BLOOD PRESSURE: 129 MMHG | WEIGHT: 220 LBS

## 2024-01-19 DIAGNOSIS — M21.372 FOOT DROP, LEFT: ICD-10-CM

## 2024-01-19 DIAGNOSIS — S84.12XS: ICD-10-CM

## 2024-01-19 DIAGNOSIS — R29.898 LEFT LEG WEAKNESS: Primary | ICD-10-CM

## 2024-01-22 ENCOUNTER — TELEPHONE (OUTPATIENT)
Dept: CARDIOLOGY | Facility: CLINIC | Age: 44
End: 2024-01-22

## 2024-01-22 NOTE — TELEPHONE ENCOUNTER
[facility] staff attempted to follow warm transfer process and was unsuccessful         Caller: Abner Champion    Relationship to patient: Self    Best call back number:   [phone]       Chief complaint:PATIENT STATES THAT HE WILL BE HAVING SURGERY 1-31-23 AND NEEDS A PREOP WITH HIS PRIMARY CARE PROVIDER ELIZABETH PUENTES.    [facility] UNABLE TO SCHEDULE    Additional notes:PLEASE CALL TO SCHEDULE ASAP  THANK YOU.

## 2024-01-24 ENCOUNTER — PREP FOR SURGERY (OUTPATIENT)
Dept: OTHER | Facility: HOSPITAL | Age: 44
End: 2024-01-24
Payer: COMMERCIAL

## 2024-01-24 DIAGNOSIS — Z12.11 SCREEN FOR COLON CANCER: Primary | ICD-10-CM

## 2024-01-26 PROBLEM — Z12.11 SCREEN FOR COLON CANCER: Status: ACTIVE | Noted: 2024-01-24

## 2024-01-29 ENCOUNTER — OFFICE VISIT (OUTPATIENT)
Dept: FAMILY MEDICINE CLINIC | Facility: CLINIC | Age: 44
End: 2024-01-29
Payer: COMMERCIAL

## 2024-01-29 ENCOUNTER — TELEPHONE (OUTPATIENT)
Dept: PEDIATRICS | Facility: OTHER | Age: 44
End: 2024-01-29

## 2024-01-29 VITALS
TEMPERATURE: 97.5 F | RESPIRATION RATE: 17 BRPM | DIASTOLIC BLOOD PRESSURE: 70 MMHG | OXYGEN SATURATION: 97 % | HEART RATE: 64 BPM | SYSTOLIC BLOOD PRESSURE: 110 MMHG | BODY MASS INDEX: 29.8 KG/M2 | HEIGHT: 72 IN | WEIGHT: 220 LBS

## 2024-01-29 DIAGNOSIS — Z01.818 PREOP EXAMINATION: Primary | ICD-10-CM

## 2024-01-29 PROCEDURE — 99213 OFFICE O/P EST LOW 20 MIN: CPT | Performed by: FAMILY MEDICINE

## 2024-01-29 NOTE — PROGRESS NOTES
Subjective   Chief Complaint   Patient presents with    Procedure     Pt here for surgery clearance      History of Present Illness   Abner Champion is a 43 y.o. male who presents to the office today for a preoperative evaluation.  The patient is going to get an excision of the ganglion cyst of the left knee as well as decompression of his superficial and deep peroneal nerves.  Main complaint is his foot drop on his left foot due to the cyst pressing against the nerve.    Planned anesthesia: general.     Surgical procedure risk level (ACC/AHA Classification): Low risk   High risk (>5%) : major emergency surgery, aortic and major vascular surgery,    peripheral vascular surgery, prolonged surgery with large fluid    shifts   Moderate risk (1-5%): Carotid endarterectomy, head and neck surgery,     intraperitoneal or intrathoracic surgery, orthopedic surgery,    prostate surgery   Low risk (<1%): cataract removal, endoscopy, breast surgery, superficial     Procedures    Cardiac Evaluation  Patient denies: none  Patient complains of: none  RCRI: 1 (Risk of major cardiac event: 0.9%)  Need for EKG?: no  Need for further cardiac evaluation?: no    The following portions of the patient's history were reviewed and updated as appropriate:    Past Medical History:   Diagnosis Date    Low back pain     Suspected COVID-19 virus infection 11/21/2021    Vision changes     RIGHT EYE       Past Surgical History:   Procedure Laterality Date    EYE SURGERY  3/16/22    HERNIA REPAIR      as a baby    KNEE SURGERY Left     KNEE ARTHROSCOPY    ORBITOTOMY Right 03/24/2022    Procedure: RIGHT  LATERAL ORBITOMY WITH BONE WINDOW FOR MASS EXCISON WITH FROZEN SECTIONS;  Surgeon: Wyatt Turner MD;  Location: [facility] OR [facility];  Service: Ophthalmology;  Laterality: Right;       Family History   Problem Relation Age of Onset    Diabetes Paternal Uncle     Other Paternal Grandfather         memory issues     Heart disease Paternal  Grandfather     Cancer Paternal Grandfather     Heart disease Maternal Grandfather        Social History     Socioeconomic History    Marital status:    Tobacco Use    Smoking status: Never     Passive exposure: Never   Vaping Use    Vaping Use: Never used   Substance and Sexual Activity    Alcohol use: Not Currently     Comment: rare    Drug use: Never    Sexual activity: Not Currently     Partners: Female       Medications:    Current Outpatient Medications:     atorvastatin (LIPITOR) 20 MG tablet, Take 1 tablet by mouth Daily., Disp: 90 tablet, Rfl: 3    b complex-C-folic acid 1 MG capsule, Take 1 capsule by mouth Daily. (Patient not taking: Reported on 1/29/2024), Disp: , Rfl:     cholecalciferol (VITAMIN D3) 25 MCG (1000 UT) tablet, Take 1 tablet by mouth Daily. PT HOLDING FOR SURGERY (Patient not taking: Reported on 1/29/2024), Disp: , Rfl:     coenzyme Q10 50 MG capsule capsule, Take  by mouth Daily., Disp: , Rfl:     tadalafil (CIALIS) 10 MG tablet, Take 1 tablet by mouth Daily As Needed for Erectile Dysfunction. (Patient not taking: Reported on 1/29/2024), Disp: 90 tablet, Rfl: 3    Zinc Sulfate (ZINC 15 PO), Take 1 tablet by mouth As Needed. PT HOLDING FOR SURGERY (Patient not taking: Reported on 1/29/2024), Disp: , Rfl:     No Known Allergies    Review of Systems   Constitutional:  Negative for activity change, appetite change and fatigue.   HENT:  Negative for ear pain, nosebleeds, sore throat and trouble swallowing.    Eyes:  Negative for photophobia, pain and redness.   Respiratory:  Negative for choking, chest tightness and shortness of breath.    Cardiovascular:  Negative for chest pain and palpitations.   Gastrointestinal:  Negative for abdominal pain, blood in stool and nausea.   Genitourinary:  Negative for dysuria, hematuria and urgency.   Musculoskeletal:  Negative for arthralgias.        Left foot drop   Skin:  Negative for color change and wound.   Neurological:  Negative for dizziness,  "seizures and light-headedness.   Hematological:  Does not bruise/bleed easily.   Psychiatric/Behavioral:  Negative for confusion and hallucinations.          Objective   Visit Vitals  /70   Pulse 64   Temp 97.5 °F (36.4 °C)   Resp 17   Ht 182.9 cm (72\")   Wt 99.8 kg (220 lb)   SpO2 97%   BMI 29.84 kg/m²       Physical Exam  Vitals reviewed.   Constitutional:       Appearance: He is well-developed.   HENT:      Head: Normocephalic and atraumatic.      Nose: Nose normal.   Eyes:      General: Lids are normal.      Conjunctiva/sclera: Conjunctivae normal.      Pupils: Pupils are equal, round, and reactive to light.   Cardiovascular:      Rate and Rhythm: Normal rate and regular rhythm.      Pulses: Normal pulses.      Heart sounds: Normal heart sounds.   Pulmonary:      Effort: Pulmonary effort is normal. No respiratory distress.      Breath sounds: Normal breath sounds.   Abdominal:      General: Abdomen is flat. Bowel sounds are normal.      Palpations: Abdomen is soft.   Musculoskeletal:         General: Normal range of motion.      Cervical back: Normal range of motion and neck supple.      Left foot: Foot drop present.   Skin:     General: Skin is warm and dry.      Capillary Refill: Capillary refill takes less than 2 seconds.   Neurological:      Mental Status: He is alert and oriented to person, place, and time.   Psychiatric:         Behavior: Behavior normal.         Thought Content: Thought content normal.         Judgment: Judgment normal.               Assessment and Plan:  Diagnoses and all orders for this visit:    1. Preop examination (Primary)  Assessment & Plan:  Preop exam completed.  Patient is stable and cleared for surgery.          43 y.o. male with planned surgery as above.    Known risk factors for perioperative complications: None      1. Preoperative workup as follows none.  2. Change in medication regimen before surgery: none, continue medication regimen including morning of surgery, " with sip of water.  3. Prophylaxis for cardiac events with perioperative beta-blockers: not indicated.                 Answers submitted by the patient for this visit:  Primary Reason for Visit (Submitted on 1/25/2024)  What is the primary reason for your visit?: Other  Other (Submitted on 1/25/2024)  Please describe your symptoms.: This is pre admission test for a surgery that is scheduled for 01/31/24  Have you had these symptoms before?: No  How long have you been having these symptoms?: 1-4 days  Please list any medications you are currently taking for this condition.: A  Please describe any probable cause for these symptoms. : A

## 2024-01-29 NOTE — TELEPHONE ENCOUNTER
Caller: MADDISON WITH [facility]    Relationship:     Best call back number: [phone]     What form or medical record are you requesting: CLINICAL NOTE FROM TODAY'S VISIT    Who is requesting this form or medical record from you: MADDISON FROM [facility]     How would you like to receive the form or medical records (pick-up, mail, fax): FAX  If fax, what is the fax number:[phone]      Timeframe paperwork needed: TODAY     Additional notes: PLEASE FAX CLINICAL NOTE FROM TODAY'S VISIT AFTER THE VISIT        THANKS

## 2024-02-07 ENCOUNTER — OFFICE VISIT (OUTPATIENT)
Dept: FAMILY MEDICINE CLINIC | Facility: CLINIC | Age: 44
End: 2024-02-07
Payer: COMMERCIAL

## 2024-02-07 VITALS
WEIGHT: 226 LBS | RESPIRATION RATE: 18 BRPM | DIASTOLIC BLOOD PRESSURE: 78 MMHG | OXYGEN SATURATION: 97 % | HEIGHT: 72 IN | HEART RATE: 70 BPM | BODY MASS INDEX: 30.61 KG/M2 | SYSTOLIC BLOOD PRESSURE: 102 MMHG | TEMPERATURE: 97.7 F

## 2024-02-07 DIAGNOSIS — M67.469: Primary | ICD-10-CM

## 2024-02-07 RX ORDER — GABAPENTIN 300 MG/1
CAPSULE ORAL
COMMUNITY
Start: 2024-01-31

## 2024-02-07 NOTE — PROGRESS NOTES
"Chief Complaint  Chief Complaint   Patient presents with    Procedure     Pt here for post op        Subjective    History of Present Illness        Abner Champion presents to [facility] for   History of Present Illness  Patient is a 43-year-old male is being seen in the clinic for postop on cyst removal on his left knee.  The procedure was a success.  He has little pain but is currently taking gabapentin to control nerve pain.  He is currently following up in this clinic to have the incision site reviewed to determine if there is any infection present.  He is to follow-up with the surgeon via telehealth on 2/8/2024.       Objective   Vital Signs:   Visit Vitals  /78   Pulse 70   Temp 97.7 °F (36.5 °C)   Resp 18   Ht 182.9 cm (72\")   Wt 103 kg (226 lb)   SpO2 97%   BMI 30.65 kg/m²             Physical Exam  Vitals reviewed.   Constitutional:       Appearance: He is well-developed.   HENT:      Head: Normocephalic.      Right Ear: External ear normal.      Left Ear: External ear normal.      Nose: Nose normal.   Eyes:      Conjunctiva/sclera: Conjunctivae normal.   Cardiovascular:      Rate and Rhythm: Normal rate and regular rhythm.   Pulmonary:      Effort: Pulmonary effort is normal.      Breath sounds: Normal breath sounds.   Musculoskeletal:         General: Normal range of motion.      Cervical back: Normal range of motion and neck supple.   Skin:     General: Skin is warm and dry.      Capillary Refill: Capillary refill takes less than 2 seconds.          Neurological:      Mental Status: He is alert and oriented to person, place, and time.            Result Review :                    Assessment and Plan      Diagnoses and all orders for this visit:    1. Peroneal ganglion cyst (Primary)  Assessment & Plan:  The cyst was removed.  He is doing well.  He will need to RTC if his symptoms do not improve.               Follow Up   No follow-ups on file.  Patient was given " instructions and counseling regarding his condition or for health maintenance advice. Please see specific information pulled into the AVS if appropriate.

## 2024-02-08 PROBLEM — M67.469: Status: ACTIVE | Noted: 2024-02-08

## 2024-02-13 DIAGNOSIS — E78.2 MIXED HYPERLIPIDEMIA: Chronic | ICD-10-CM

## 2024-02-13 RX ORDER — ATORVASTATIN CALCIUM 20 MG/1
20 TABLET, FILM COATED ORAL DAILY
Qty: 90 TABLET | Refills: 3 | Status: SHIPPED | OUTPATIENT
Start: 2024-02-13

## 2024-02-15 ENCOUNTER — TELEPHONE (OUTPATIENT)
Dept: SURGERY | Facility: CLINIC | Age: 44
End: 2024-02-15
Payer: COMMERCIAL

## 2024-02-27 ENCOUNTER — HOSPITAL ENCOUNTER (OUTPATIENT)
Facility: HOSPITAL | Age: 44
Setting detail: HOSPITAL OUTPATIENT SURGERY
Discharge: HOME | End: 2024-02-27
Attending: SURGERY | Admitting: SURGERY
Payer: COMMERCIAL

## 2024-02-27 ENCOUNTER — ANESTHESIA (OUTPATIENT)
Dept: GASTROENTEROLOGY | Facility: HOSPITAL | Age: 44
End: 2024-02-27
Payer: COMMERCIAL

## 2024-02-27 ENCOUNTER — ANESTHESIA EVENT (OUTPATIENT)
Dept: GASTROENTEROLOGY | Facility: HOSPITAL | Age: 44
End: 2024-02-27
Payer: COMMERCIAL

## 2024-02-27 VITALS
WEIGHT: 216 LBS | HEART RATE: 58 BPM | TEMPERATURE: 98.1 F | RESPIRATION RATE: 16 BRPM | SYSTOLIC BLOOD PRESSURE: 107 MMHG | DIASTOLIC BLOOD PRESSURE: 72 MMHG | OXYGEN SATURATION: 98 % | HEIGHT: 72 IN | BODY MASS INDEX: 29.26 KG/M2

## 2024-02-27 PROCEDURE — 45378 DIAGNOSTIC COLONOSCOPY: CPT | Performed by: SURGERY

## 2024-02-27 PROCEDURE — 25810000003 LACTATED RINGERS PER 1000 ML: Performed by: SURGERY

## 2024-02-27 PROCEDURE — 25010000002 PROPOFOL 10 MG/ML EMULSION: Performed by: NURSE ANESTHETIST, CERTIFIED REGISTERED

## 2024-02-27 RX ORDER — SODIUM CHLORIDE, SODIUM LACTATE, POTASSIUM CHLORIDE, CALCIUM CHLORIDE 600; 310; 30; 20 MG/100ML; MG/100ML; MG/100ML; MG/100ML
30 INJECTION, SOLUTION INTRAVENOUS CONTINUOUS PRN
Status: DISCONTINUED | OUTPATIENT
Start: 2024-02-27 | End: 2024-02-27 | Stop reason: HOSPADM

## 2024-02-27 RX ORDER — SODIUM CHLORIDE 0.9 % (FLUSH) 0.9 %
10 SYRINGE (ML) INJECTION AS NEEDED
Status: DISCONTINUED | OUTPATIENT
Start: 2024-02-27 | End: 2024-02-27 | Stop reason: HOSPADM

## 2024-02-27 RX ORDER — LIDOCAINE HYDROCHLORIDE 20 MG/ML
INJECTION, SOLUTION INFILTRATION; PERINEURAL AS NEEDED
Status: DISCONTINUED | OUTPATIENT
Start: 2024-02-27 | End: 2024-02-27 | Stop reason: SURG

## 2024-02-27 RX ORDER — SODIUM CHLORIDE 9 MG/ML
40 INJECTION, SOLUTION INTRAVENOUS AS NEEDED
Status: DISCONTINUED | OUTPATIENT
Start: 2024-02-27 | End: 2024-02-27 | Stop reason: HOSPADM

## 2024-02-27 RX ORDER — PROPOFOL 10 MG/ML
VIAL (ML) INTRAVENOUS CONTINUOUS PRN
Status: DISCONTINUED | OUTPATIENT
Start: 2024-02-27 | End: 2024-02-27 | Stop reason: SURG

## 2024-02-27 RX ORDER — SODIUM CHLORIDE 0.9 % (FLUSH) 0.9 %
10 SYRINGE (ML) INJECTION EVERY 12 HOURS SCHEDULED
Status: DISCONTINUED | OUTPATIENT
Start: 2024-02-27 | End: 2024-02-27 | Stop reason: HOSPADM

## 2024-02-27 RX ADMIN — LIDOCAINE HYDROCHLORIDE 60 MG: 20 INJECTION, SOLUTION INFILTRATION; PERINEURAL at 07:32

## 2024-02-27 RX ADMIN — SODIUM CHLORIDE, POTASSIUM CHLORIDE, SODIUM LACTATE AND CALCIUM CHLORIDE 30 ML/HR: 600; 310; 30; 20 INJECTION, SOLUTION INTRAVENOUS at 07:26

## 2024-02-27 RX ADMIN — PROPOFOL 100 MG: 10 INJECTION, EMULSION INTRAVENOUS at 07:32

## 2024-02-27 RX ADMIN — PROPOFOL 180 MCG/KG/MIN: 10 INJECTION, EMULSION INTRAVENOUS at 07:33

## 2024-02-27 NOTE — ANESTHESIA PREPROCEDURE EVALUATION
Anesthesia Evaluation     Patient summary reviewed and Nursing notes reviewed                Airway   Mallampati: II  TM distance: >3 FB  Neck ROM: full  Dental      Pulmonary - negative pulmonary ROS   Cardiovascular     ECG reviewed  Rhythm: regular  Rate: normal    (+) hyperlipidemia      Neuro/Psych- negative ROS  GI/Hepatic/Renal/Endo    (+) obesity    Musculoskeletal (-) negative ROS    Abdominal    Substance History - negative use     OB/GYN negative ob/gyn ROS         Other                      Anesthesia Plan    ASA 2     MAC     intravenous induction     Anesthetic plan, risks, benefits, and alternatives have been provided, discussed and informed consent has been obtained with: patient.    Plan discussed with CRNA.    CODE STATUS:

## 2024-02-27 NOTE — OP NOTE
Colonoscopy Procedure Note  Abner Champion  1980  Date of Procedure: 02/27/24    Pre-operative Diagnosis:    Encounter for screening colonoscopy    Post-operative Diagnosis:  Normal colonoscopy    Procedure: Colonoscopy to cecum    Findings/Treatments:   Normal colonoscopy       Recommendations:   Repeat colonoscopy in 10 years.      Surgeon: Aric Negrete MD    Anesthetic: MAC per No responsible provider has been recorded for the case.      Procedure Details:    MAC anesthesia was induced.  A digital rectal exam was performed along with visual inspection of the anus. The 180 Colonoscope was inserted into the rectum and advanced to the cecum, with relative ease.  Cecum was identified by the appendiceal orifice and the ileocecal valve and photographed for documentation.       A careful inspection was made as the scope was withdrawn, including a retroflexed view of the rectum.  There were no encountered abnormalities of the entire examined mucosa. Retroflexion in the rectum revealed no abnormalities. Prep quality was excellent.      Aric Negrete M.D.  General, Endoscopic, and Robotic Surgery  [facility]    [address], [address]  [place], [place], [address]  P: [phone]  F: [phone]

## 2024-02-27 NOTE — ANESTHESIA POSTPROCEDURE EVALUATION
Patient: Abner Champion    Procedure Summary       Date: 02/27/24 Room / Location: [facility] ENDOSCOPY 8 / [facility] ENDOSCOPY    Anesthesia Start: 0730 Anesthesia Stop: 0757    Procedure: COLONOSCOPY to cecum Diagnosis:       Screen for colon cancer      (Screen for colon cancer [Z12.11])    Surgeons: Aric Negrete MD Provider: Wyatt Foss MD    Anesthesia Type: MAC ASA Status: 2            Anesthesia Type: MAC    Vitals  Vitals Value Taken Time   BP 94/65 02/27/24 0810   Temp     Pulse 61 02/27/24 0819   Resp 16 02/27/24 0810   SpO2 95 % 02/27/24 0819   Vitals shown include unfiled device data.        Post Anesthesia Care and Evaluation    Patient location during evaluation: PACU  Patient participation: complete - patient participated  Level of consciousness: awake and alert  Pain management: adequate    Airway patency: patent  Anesthetic complications: No anesthetic complications    Cardiovascular status: acceptable  Respiratory status: acceptable  Hydration status: acceptable    Comments: --------------------            02/27/24               0810     --------------------   BP:       94/65      Pulse:      61       Resp:       16       Temp:                SpO2:      96%      --------------------

## 2024-02-27 NOTE — H&P
SURGERY  Abner Champion  1980 02/27/24    HPI: 43 y.o. male here for screening colonoscopy.  No prior colonoscopy. Denies family history of colon cancer.    Past Medical History:   Diagnosis Date    Hyperlipidemia     Low back pain     Suspected COVID-19 virus infection 11/21/2021    Vision changes     RIGHT EYE       Past Surgical History:   Procedure Laterality Date    EYE SURGERY  3/16/22    HERNIA REPAIR      as a baby    KNEE SURGERY Left     KNEE ARTHROSCOPY, GANGLION CYST    ORBITOTOMY Right 03/24/2022    Procedure: RIGHT  LATERAL ORBITOMY WITH BONE WINDOW FOR MASS EXCISON WITH FROZEN SECTIONS;  Surgeon: Wyatt Turner MD;  Location: [facility] OR [facility];  Service: Ophthalmology;  Laterality: Right;       has No Known Allergies.       Medication List        ASK your doctor about these medications      atorvastatin 20 MG tablet  Commonly known as: LIPITOR  Take 1 tablet by mouth Daily.     b complex-C-folic acid 1 MG capsule     cholecalciferol 25 MCG (1000 UT) tablet  Commonly known as: VITAMIN D3     gabapentin 300 MG capsule  Commonly known as: NEURONTIN     tadalafil 10 MG tablet  Commonly known as: CIALIS  Take 1 tablet by mouth Daily As Needed for Erectile Dysfunction.     ZINC 15 PO              Family History   Problem Relation Age of Onset    Diabetes Paternal Uncle     Heart disease Maternal Grandfather         Heavy smoker    Other Paternal Grandfather         Alzheimer’s    Heart disease Paternal Grandfather     Cancer Paternal Grandfather     Malig Hyperthermia Neg Hx        Social History     Socioeconomic History    Marital status:    Tobacco Use    Smoking status: Never     Passive exposure: Never   Vaping Use    Vaping Use: Never used   Substance and Sexual Activity    Alcohol use: Not Currently     Comment: rare    Drug use: Never    Sexual activity: Yes     Partners: Female     Birth control/protection: I.U.D., Abstinence, Vasectomy       Vitals:    02/27/24 0711   BP:  101/67   Pulse: 59   Resp: 16   Temp: 98.1 °F (36.7 °C)   SpO2: 98%       Body mass index is 29.29 kg/m².    Physical Exam    General: No acute distress  Lungs: No labored breathing, Pulse oximetry on room air is 98%.  Heart: RRR  Abdo: Soft  Mental:  Awake, alert, and oriented      Assessment/Plan  Encounter for Screening Colonoscopy  Proceed with screening colonoscopy.  Risk, benefits discussed including risk of perforation, bleeding.    Aric Negrete MD  07:32 EST

## 2024-06-04 NOTE — PROGRESS NOTES
EMG and Nerve Conduction Studies    Patient Name: Abner Champion  Date of birth July 6, 1980    Date of Study:6-6-2024    Referring Provider:Art PALMA    History:    LLE  Foot drop, left foot     Results:    The complete report includes the data sheets.     Prior to starting the procedure, the patient's identity was verified, pertinent available records were reviewed, the nature of the procedure was explained, the appropriate site of the exam were confirmed directly with the patient, and a pre-procedure pause was performed for final verification of all the above.    1.  The left peroneal motor conduction study was attempted with recording electrodes over the extensor digitorum brevis muscle with no response recorded with stimulation at the ankle.  With recording electrodes over the tibialis anterior muscle and low amplitude response was recorded with normal conduction velocity across the fibular head.    2.  Electromyography of the tibialis anterior muscle showed fibrillations positive sharp waves there were increased amplitude and duration motor units with a decreased recruitment.  The extensor digitorum brevis showed an occasional PSW and a discrete motor unit pattern with some polyphasic and increased amplitude and duration motor units.  The vastus lateralis, gastrocnemius and peroneus longus muscles were normal.    Impression:    This study demonstrates reinnervation changes in the muscles of the deep peroneal nerve with greater reinnervation changes in the more proximal tibialis anterior compared to the distal extensor digitorum brevis muscle.  Compared to the previous study there is evidence of reinnervation.      Electronically signed by :    Joseph Seipel, M.D.  June 11, 2024

## 2024-06-06 ENCOUNTER — PROCEDURE VISIT (OUTPATIENT)
Dept: NEUROLOGY | Facility: CLINIC | Age: 44
End: 2024-06-06
Payer: COMMERCIAL

## 2024-06-06 VITALS
DIASTOLIC BLOOD PRESSURE: 83 MMHG | WEIGHT: 216 LBS | SYSTOLIC BLOOD PRESSURE: 118 MMHG | HEIGHT: 72 IN | HEART RATE: 97 BPM | BODY MASS INDEX: 29.26 KG/M2

## 2024-06-06 DIAGNOSIS — S84.12XS: ICD-10-CM

## 2024-06-06 DIAGNOSIS — R29.898 LEFT LEG WEAKNESS: ICD-10-CM

## 2024-06-06 DIAGNOSIS — M21.372 FOOT DROP, LEFT: Primary | ICD-10-CM

## 2024-06-06 PROCEDURE — 95907 NVR CNDJ TST 1-2 STUDIES: CPT | Performed by: PSYCHIATRY & NEUROLOGY

## 2024-06-06 PROCEDURE — 95886 MUSC TEST DONE W/N TEST COMP: CPT | Performed by: PSYCHIATRY & NEUROLOGY

## 2024-11-20 ENCOUNTER — OFFICE VISIT (OUTPATIENT)
Dept: FAMILY MEDICINE CLINIC | Facility: CLINIC | Age: 44
End: 2024-11-20
Payer: COMMERCIAL

## 2024-11-20 VITALS
WEIGHT: 220 LBS | HEART RATE: 77 BPM | HEIGHT: 72 IN | TEMPERATURE: 97.2 F | DIASTOLIC BLOOD PRESSURE: 88 MMHG | RESPIRATION RATE: 12 BRPM | SYSTOLIC BLOOD PRESSURE: 120 MMHG | OXYGEN SATURATION: 96 % | BODY MASS INDEX: 29.8 KG/M2

## 2024-11-20 DIAGNOSIS — Z00.00 ANNUAL PHYSICAL EXAM: Primary | ICD-10-CM

## 2024-11-20 DIAGNOSIS — Z12.5 SCREENING FOR PROSTATE CANCER: ICD-10-CM

## 2024-11-20 DIAGNOSIS — E78.2 MIXED HYPERLIPIDEMIA: Chronic | ICD-10-CM

## 2024-11-20 DIAGNOSIS — N52.9 ERECTILE DYSFUNCTION, UNSPECIFIED ERECTILE DYSFUNCTION TYPE: ICD-10-CM

## 2024-11-20 PROCEDURE — 99396 PREV VISIT EST AGE 40-64: CPT | Performed by: FAMILY MEDICINE

## 2024-11-20 RX ORDER — TADALAFIL 10 MG/1
10 TABLET ORAL DAILY PRN
Qty: 90 TABLET | Refills: 3 | Status: SHIPPED | OUTPATIENT
Start: 2024-11-20

## 2024-11-20 RX ORDER — ATORVASTATIN CALCIUM 20 MG/1
20 TABLET, FILM COATED ORAL DAILY
Qty: 90 TABLET | Refills: 3 | Status: SHIPPED | OUTPATIENT
Start: 2024-11-20

## 2024-11-20 NOTE — PROGRESS NOTES
"Chief Complaint  Chief Complaint   Patient presents with    Annual Exam       Subjective    History of Present Illness        Abner Champion presents to [facility] for   History of Present Illness  Abner Champion is a 44 y.o. male here for his annual physical with me. Abner is here for coordination of medical care, to discuss health maintenance, disease prevention as well as to followup on medical problems. Patient has been followed by me since 2019. Patient's last CPE was 2023. Activity level is minimal. Exercises 1 per week. Appetite is good. Feels well with few complaints. Energy level is fair. Sleeps well. Patient's last colonoscopy was 2024. He is advised to repeat in 10 years.     History of Present Illness      Objective   Vital Signs:   Visit Vitals  /88   Pulse 77   Temp 97.2 °F (36.2 °C) (Temporal)   Resp 12   Ht 182.9 cm (72\")   Wt 99.8 kg (220 lb)   SpO2 96%   BMI 29.84 kg/m²          BMI is below normal parameters (malnutrition). Recommendations: none (medical contraindication)     Physical Exam  Vitals reviewed.   Constitutional:       Appearance: Normal appearance. He is well-developed.   HENT:      Head: Normocephalic and atraumatic.      Right Ear: Tympanic membrane, ear canal and external ear normal.      Left Ear: Tympanic membrane, ear canal and external ear normal.      Nose: Nose normal.      Mouth/Throat:      Mouth: Mucous membranes are moist.      Pharynx: Oropharynx is clear.   Eyes:      General: Lids are normal.      Conjunctiva/sclera: Conjunctivae normal.      Pupils: Pupils are equal, round, and reactive to light.   Cardiovascular:      Rate and Rhythm: Normal rate and regular rhythm.      Pulses: Normal pulses.      Heart sounds: Normal heart sounds.   Pulmonary:      Effort: Pulmonary effort is normal. No respiratory distress.      Breath sounds: Normal breath sounds.   Abdominal:      General: Abdomen is flat. Bowel sounds are normal.      " Palpations: Abdomen is soft.   Musculoskeletal:         General: Normal range of motion.      Cervical back: Normal range of motion and neck supple.   Skin:     General: Skin is warm and dry.      Capillary Refill: Capillary refill takes less than 2 seconds.   Neurological:      General: No focal deficit present.      Mental Status: He is alert and oriented to person, place, and time. Mental status is at baseline.   Psychiatric:         Mood and Affect: Mood normal.         Behavior: Behavior normal.         Thought Content: Thought content normal.         Judgment: Judgment normal.        Physical Exam           Result Review :  Results                            Assessment and Plan      Diagnoses and all orders for this visit:    1. Annual physical exam (Primary)  Assessment & Plan:  Discussed injury prevention, diet and exercise, safe sexual practices, and screening for common diseases. Encouraged use of sunscreen and seatbelts. Avoidance of tobacco encouraged. Limitation or avoidance of alcohol encouraged. Recommend yearly dental and eye exams. Also discussed monitoring of blood pressure, lipids.    Orders:  -     CBC & Differential  -     Comprehensive Metabolic Panel  -     Lipid Panel With / Chol / HDL Ratio  -     TSH    2. Mixed hyperlipidemia  -     atorvastatin (LIPITOR) 20 MG tablet; Take 1 tablet by mouth Daily.  Dispense: 90 tablet; Refill: 3    3. Erectile dysfunction, unspecified erectile dysfunction type  -     tadalafil (CIALIS) 10 MG tablet; Take 1 tablet by mouth Daily As Needed for Erectile Dysfunction.  Dispense: 90 tablet; Refill: 3    4. Screening for prostate cancer  -     PSA Screen       Assessment & Plan             Follow Up   No follow-ups on file.  Patient was given instructions and counseling regarding his condition or for health maintenance advice. Please see specific information pulled into the AVS if appropriate.

## 2024-11-21 LAB
ALBUMIN SERPL-MCNC: 4.7 G/DL (ref 3.5–5.2)
ALBUMIN/GLOB SERPL: 1.8 G/DL
ALP SERPL-CCNC: 78 U/L (ref 39–117)
ALT SERPL-CCNC: 21 U/L (ref 1–41)
AST SERPL-CCNC: 28 U/L (ref 1–40)
BASOPHILS # BLD AUTO: 0.05 10*3/MM3 (ref 0–0.2)
BASOPHILS NFR BLD AUTO: 0.7 % (ref 0–1.5)
BILIRUB SERPL-MCNC: 0.4 MG/DL (ref 0–1.2)
BUN SERPL-MCNC: 15 MG/DL (ref 6–20)
BUN/CREAT SERPL: 15 (ref 7–25)
CALCIUM SERPL-MCNC: 9.7 MG/DL (ref 8.6–10.5)
CHLORIDE SERPL-SCNC: 104 MMOL/L (ref 98–107)
CHOLEST SERPL-MCNC: 211 MG/DL (ref 0–200)
CHOLEST/HDLC SERPL: 3.7 {RATIO}
CO2 SERPL-SCNC: 28.9 MMOL/L (ref 22–29)
CREAT SERPL-MCNC: 1 MG/DL (ref 0.76–1.27)
EGFRCR SERPLBLD CKD-EPI 2021: 94 ML/MIN/1.73
EOSINOPHIL # BLD AUTO: 0.18 10*3/MM3 (ref 0–0.4)
EOSINOPHIL NFR BLD AUTO: 2.7 % (ref 0.3–6.2)
ERYTHROCYTE [DISTWIDTH] IN BLOOD BY AUTOMATED COUNT: 12.8 % (ref 12.3–15.4)
GLOBULIN SER CALC-MCNC: 2.6 GM/DL
GLUCOSE SERPL-MCNC: 79 MG/DL (ref 65–99)
HCT VFR BLD AUTO: 45.6 % (ref 37.5–51)
HDLC SERPL-MCNC: 57 MG/DL (ref 40–60)
HGB BLD-MCNC: 15.9 G/DL (ref 13–17.7)
IMM GRANULOCYTES # BLD AUTO: 0.03 10*3/MM3 (ref 0–0.05)
IMM GRANULOCYTES NFR BLD AUTO: 0.4 % (ref 0–0.5)
LDLC SERPL CALC-MCNC: 117 MG/DL (ref 0–100)
LYMPHOCYTES # BLD AUTO: 2.24 10*3/MM3 (ref 0.7–3.1)
LYMPHOCYTES NFR BLD AUTO: 33.5 % (ref 19.6–45.3)
MCH RBC QN AUTO: 31.4 PG (ref 26.6–33)
MCHC RBC AUTO-ENTMCNC: 34.9 G/DL (ref 31.5–35.7)
MCV RBC AUTO: 90.1 FL (ref 79–97)
MONOCYTES # BLD AUTO: 0.59 10*3/MM3 (ref 0.1–0.9)
MONOCYTES NFR BLD AUTO: 8.8 % (ref 5–12)
NEUTROPHILS # BLD AUTO: 3.59 10*3/MM3 (ref 1.7–7)
NEUTROPHILS NFR BLD AUTO: 53.9 % (ref 42.7–76)
NRBC BLD AUTO-RTO: 0 /100 WBC (ref 0–0.2)
PLATELET # BLD AUTO: 356 10*3/MM3 (ref 140–450)
POTASSIUM SERPL-SCNC: 4.6 MMOL/L (ref 3.5–5.2)
PROT SERPL-MCNC: 7.3 G/DL (ref 6–8.5)
PSA SERPL-MCNC: 0.98 NG/ML (ref 0–4)
RBC # BLD AUTO: 5.06 10*6/MM3 (ref 4.14–5.8)
SODIUM SERPL-SCNC: 140 MMOL/L (ref 136–145)
TRIGL SERPL-MCNC: 215 MG/DL (ref 0–150)
TSH SERPL DL<=0.005 MIU/L-ACNC: 1.53 UIU/ML (ref 0.27–4.2)
VLDLC SERPL CALC-MCNC: 37 MG/DL (ref 5–40)
WBC # BLD AUTO: 6.68 10*3/MM3 (ref 3.4–10.8)

## 2024-12-23 DIAGNOSIS — N52.9 ERECTILE DYSFUNCTION, UNSPECIFIED ERECTILE DYSFUNCTION TYPE: ICD-10-CM

## 2024-12-26 RX ORDER — TADALAFIL 10 MG/1
10 TABLET ORAL DAILY PRN
Qty: 90 TABLET | Refills: 1 | Status: SHIPPED | OUTPATIENT
Start: 2024-12-26

## 2025-03-06 DIAGNOSIS — E78.2 MIXED HYPERLIPIDEMIA: Chronic | ICD-10-CM

## 2025-03-06 RX ORDER — ATORVASTATIN CALCIUM 20 MG/1
20 TABLET, FILM COATED ORAL DAILY
Qty: 90 TABLET | Refills: 3 | OUTPATIENT
Start: 2025-03-06

## 2025-03-11 ENCOUNTER — LAB (OUTPATIENT)
Dept: LAB | Facility: HOSPITAL | Age: 45
End: 2025-03-11
Payer: COMMERCIAL

## 2025-03-11 ENCOUNTER — RESULTS FOLLOW-UP (OUTPATIENT)
Dept: URGENT CARE | Facility: CLINIC | Age: 45
End: 2025-03-11

## 2025-03-11 DIAGNOSIS — A09 DIARRHEA OF INFECTIOUS ORIGIN: Primary | ICD-10-CM

## 2025-03-11 DIAGNOSIS — R19.7 DIARRHEA, UNSPECIFIED TYPE: ICD-10-CM

## 2025-03-11 LAB
ADV 40+41 DNA STL QL NAA+NON-PROBE: NOT DETECTED
ASTRO TYP 1-8 RNA STL QL NAA+NON-PROBE: NOT DETECTED
C CAYETANENSIS DNA STL QL NAA+NON-PROBE: NOT DETECTED
C COLI+JEJ+UPSA DNA STL QL NAA+NON-PROBE: NOT DETECTED
CRYPTOSP DNA STL QL NAA+NON-PROBE: NOT DETECTED
E HISTOLYT DNA STL QL NAA+NON-PROBE: NOT DETECTED
EAEC PAA PLAS AGGR+AATA ST NAA+NON-PRB: DETECTED
EC STX1+STX2 GENES STL QL NAA+NON-PROBE: NOT DETECTED
EPEC EAE GENE STL QL NAA+NON-PROBE: DETECTED
ETEC LTA+ST1A+ST1B TOX ST NAA+NON-PROBE: NOT DETECTED
G LAMBLIA DNA STL QL NAA+NON-PROBE: NOT DETECTED
NOROVIRUS GI+II RNA STL QL NAA+NON-PROBE: NOT DETECTED
P SHIGELLOIDES DNA STL QL NAA+NON-PROBE: NOT DETECTED
RVA RNA STL QL NAA+NON-PROBE: NOT DETECTED
S ENT+BONG DNA STL QL NAA+NON-PROBE: NOT DETECTED
SAPO I+II+IV+V RNA STL QL NAA+NON-PROBE: DETECTED
SHIGELLA SP+EIEC IPAH ST NAA+NON-PROBE: NOT DETECTED
V CHOL+PARA+VUL DNA STL QL NAA+NON-PROBE: NOT DETECTED
V CHOLERAE DNA STL QL NAA+NON-PROBE: NOT DETECTED
Y ENTEROCOL DNA STL QL NAA+NON-PROBE: NOT DETECTED

## 2025-03-11 PROCEDURE — 87324 CLOSTRIDIUM AG IA: CPT

## 2025-03-11 PROCEDURE — 87449 NOS EACH ORGANISM AG IA: CPT

## 2025-03-11 PROCEDURE — 87507 IADNA-DNA/RNA PROBE TQ 12-25: CPT

## 2025-03-11 RX ORDER — AZITHROMYCIN 500 MG/1
500 TABLET, FILM COATED ORAL DAILY
Qty: 3 TABLET | Refills: 0 | Status: SHIPPED | OUTPATIENT
Start: 2025-03-11 | End: 2025-03-14

## 2025-03-11 NOTE — TELEPHONE ENCOUNTER
GI panel was positive for sapovirus and 2 types of e coli. Azithromycin as prescribed. Discussed results and treatment with patient after verifying name and . Verbalizes understanding.

## 2025-03-12 LAB
C DIFF GDH + TOXINS A+B STL QL IA.RAPID: NEGATIVE
C DIFF GDH + TOXINS A+B STL QL IA.RAPID: NEGATIVE

## 2025-08-14 ENCOUNTER — TELEPHONE (OUTPATIENT)
Dept: FAMILY MEDICINE CLINIC | Facility: CLINIC | Age: 45
End: 2025-08-14
Payer: COMMERCIAL

## (undated) DEVICE — GOWN,NON-REINFORCED,SIRUS,SET IN SLV,XL: Brand: MEDLINE

## (undated) DEVICE — GLV SURG BIOGEL SENSR LTX PF SZ7.5

## (undated) DEVICE — TRAP FLD MINIVAC MEGADYNE 100ML

## (undated) DEVICE — ADAPT CLN BIOGUARD AIR/H2O DISP

## (undated) DEVICE — TBG PENCL TELESCP MEGADYNE SMOKE EVAC 10FT

## (undated) DEVICE — SUT VIC 8/0 TG140 12IN J548G

## (undated) DEVICE — GLV SURG BIOGEL LTX PF 6 1/2

## (undated) DEVICE — SUT VIC 5/0 S14 18IN J571G

## (undated) DEVICE — PRECISION THIN (9.0 X 0.38 X 25.0MM)

## (undated) DEVICE — SENSR O2 OXIMAX FNGR A/ 18IN NONSTR

## (undated) DEVICE — SYR LL TP 10ML STRL

## (undated) DEVICE — KT ORCA ORCAPOD DISP STRL

## (undated) DEVICE — PK ENT 40

## (undated) DEVICE — TBG SXN CONN/F UNIV 1/4IN 10FT LF STRL

## (undated) DEVICE — PATIENT RETURN ELECTRODE, SINGLE-USE, CONTACT QUALITY MONITORING, ADULT, WITH 9FT CORD, FOR PATIENTS WEIGING OVER 33LBS. (15KG): Brand: MEGADYNE

## (undated) DEVICE — LN SMPL CO2 SHTRM SD STREAM W/M LUER

## (undated) DEVICE — HDRST POSITIONING FM RND 2X9IN

## (undated) DEVICE — NDL HYPO PRECISIONGLIDE/REG 30G 1/2 BRN

## (undated) DEVICE — SUT GUT PLN FAST ABS 5/0 PC1 18IN 1915G

## (undated) DEVICE — WIPE INST MEROCEL

## (undated) DEVICE — CROUCH CORNEAL PROTECTOR: Brand: BAUSCH + LOMB

## (undated) DEVICE — SUT VIC 5/0 P3 18IN J493G

## (undated) DEVICE — SWABSTK SKINPREP PVPI LF PK/3

## (undated) DEVICE — Device

## (undated) DEVICE — CANN O2 ETCO2 FITS ALL CONN CO2 SMPL A/ 7IN DISP LF

## (undated) DEVICE — ELECTRD NDL EZ CLN MOD 2.75IN

## (undated) DEVICE — STERILE COTTON TIP 6IN 10PK: Brand: MEDLINE